# Patient Record
Sex: FEMALE | Race: WHITE | Employment: UNEMPLOYED | URBAN - METROPOLITAN AREA
[De-identification: names, ages, dates, MRNs, and addresses within clinical notes are randomized per-mention and may not be internally consistent; named-entity substitution may affect disease eponyms.]

---

## 2022-03-17 ENCOUNTER — OFFICE VISIT (OUTPATIENT)
Dept: BARIATRICS/WEIGHT MGMT | Age: 52
End: 2022-03-17
Payer: COMMERCIAL

## 2022-03-17 VITALS
HEIGHT: 66 IN | WEIGHT: 230 LBS | HEART RATE: 80 BPM | BODY MASS INDEX: 36.96 KG/M2 | SYSTOLIC BLOOD PRESSURE: 120 MMHG | DIASTOLIC BLOOD PRESSURE: 80 MMHG

## 2022-03-17 DIAGNOSIS — R06.83 SNORING: ICD-10-CM

## 2022-03-17 DIAGNOSIS — E03.9 ACQUIRED HYPOTHYROIDISM: Primary | ICD-10-CM

## 2022-03-17 DIAGNOSIS — E66.9 OBESITY (BMI 35.0-39.9 WITHOUT COMORBIDITY): ICD-10-CM

## 2022-03-17 PROCEDURE — 99203 OFFICE O/P NEW LOW 30 MIN: CPT | Performed by: SURGERY

## 2022-03-17 RX ORDER — LEVOTHYROXINE SODIUM 88 UG/1
TABLET ORAL
COMMUNITY
Start: 2022-03-03

## 2022-03-17 RX ORDER — AMOXICILLIN AND CLAVULANATE POTASSIUM 500; 125 MG/1; MG/1
TABLET, FILM COATED ORAL
Status: ON HOLD | COMMUNITY
Start: 2022-03-01 | End: 2022-08-05 | Stop reason: HOSPADM

## 2022-03-17 RX ORDER — TRAZODONE HYDROCHLORIDE 100 MG/1
TABLET ORAL
Status: ON HOLD | COMMUNITY
Start: 2022-01-11 | End: 2022-08-05 | Stop reason: HOSPADM

## 2022-03-17 RX ORDER — ETONOGESTREL AND ETHINYL ESTRADIOL 11.7; 2.7 MG/1; MG/1
INSERT, EXTENDED RELEASE VAGINAL
COMMUNITY
Start: 2022-01-05

## 2022-03-17 RX ORDER — ARIPIPRAZOLE 5 MG/1
TABLET ORAL
COMMUNITY
Start: 2022-02-04

## 2022-03-17 RX ORDER — LITHIUM CARBONATE 300 MG/1
TABLET, FILM COATED, EXTENDED RELEASE ORAL
COMMUNITY
Start: 2022-02-17

## 2022-03-17 RX ORDER — FERROUS SULFATE 325(65) MG
325 TABLET ORAL
Status: ON HOLD | COMMUNITY
End: 2022-08-05 | Stop reason: HOSPADM

## 2022-03-17 RX ORDER — NEOMYCIN SULFATE, POLYMYXIN B SULFATE, AND DEXAMETHASONE 3.5; 10000; 1 MG/G; [USP'U]/G; MG/G
OINTMENT OPHTHALMIC
Status: ON HOLD | COMMUNITY
Start: 2022-03-01 | End: 2022-08-05 | Stop reason: HOSPADM

## 2022-03-17 RX ORDER — BUPROPION HYDROCHLORIDE 100 MG/1
100 TABLET, EXTENDED RELEASE ORAL 2 TIMES DAILY
COMMUNITY
Start: 2022-02-17 | End: 2023-02-17

## 2022-03-18 ENCOUNTER — TELEPHONE (OUTPATIENT)
Dept: BARIATRICS/WEIGHT MGMT | Age: 52
End: 2022-03-18

## 2022-03-18 NOTE — TELEPHONE ENCOUNTER
Patient would like sleep study faxed to [facility], they need the order and office note faxed to [phone]

## 2022-03-23 ENCOUNTER — TELEPHONE (OUTPATIENT)
Dept: BARIATRICS/WEIGHT MGMT | Age: 52
End: 2022-03-23

## 2022-03-23 NOTE — TELEPHONE ENCOUNTER
Pt calls with same request:  \"Patient would like sleep study faxed to [facility], they need the order and office note faxed to [phone]\"    Advised pt it will be sent when note and order is complete.

## 2022-03-24 NOTE — PROGRESS NOTES
[address] [facility] INVASIVE BARIATRIC SURG  [address] [place] [address] [place]  [address] [facility] [address]  Dept: [phone]    SURGICAL WEIGHT MANAGEMENT PROGRAM  PROGRESS NOTE INITIAL EVALUATION     Patient: Lalita Aj        Service Date: 3/17/2022     HPI:     Chief Complaint   Patient presents with    Bariatric, Initial Visit    Weight Loss       The patient is a pleasant 49y.o. year old female  with morbid obesity, who stands Height: 5' 5.5\" (166.4 cm) tall with a weight of Weight: 230 lb (104.3 kg) , resulting in a BMI of Body mass index is 37.69 kg/m². . The patient suffers from multiple co-morbidities as a result of morbid obesity, including: Hypothyroidism, Dyspnea on Exertion and Depression. She has suffered from obesity for many years. The patient denies  a history of myocardial infarction, deep vein thrombosis, pulmonary embolism, renal failure, hepatic failure and stroke. The patient has failed multiple attempts at non-surgical weight loss, and is now seeking surgical intervention to promote permanent and consistent weight loss. She  has chosen Sleeve Gastrectomy. She is well educated regarding it, as she has recently viewed our weight loss surgery informational seminar . Medical History:  No past medical history on file. Surgical History:  No past surgical history on file. Family History:  No family history on file. Social History:   Social History     Tobacco Use    Smoking status: Former Smoker     Packs/day: 1.00     Years: 35.00     Pack years: 35.00     Types: Cigarettes    Smokeless tobacco: Never Used   Substance Use Topics    Alcohol use:  Yes     Alcohol/week: 1.0 standard drink     Types: 1 Cans of beer per week     Comment: weekly    Drug use: Yes     Frequency: 7.0 times per week     Comment: cbd gummies       Current Med List:  Current Outpatient Medications   Medication Sig Dispense Refill    amoxicillin-clavulanate (AUGMENTIN) 500-125 MG per tablet TAKE 1 TABLET BY MOUTH TWICE A DAY      ARIPiprazole (ABILIFY) 5 MG tablet TAKE 1 TABLET BY MOUTH EVERY DAY      buPROPion (WELLBUTRIN SR) 100 MG extended release tablet Take 100 mg by mouth 2 times daily      etonogestrel-ethinyl estradiol (ELURYNG) 0.12-0.015 MG/24HR vaginal ring INSERT 1 RING VAGINALLY EVERY 4 WEEKS, LEAVE IN PLACE 3 WEEKS, THEN REMOVE      ferrous sulfate (IRON 325) 325 (65 Fe) MG tablet Take 325 mg by mouth daily (with breakfast)      levothyroxine (SYNTHROID) 88 MCG tablet TAKE 1 TABLET BY MOUTH EVERY DAY      lithium (LITHOBID) 300 MG extended release tablet 300 MG IN THE AM  MG AT NIGHT      neomycin-polymyxin-dexameth 3.5-32191-6.1 OINT APPLY 1 A THIN LAYER TWICE A DAY AFTER SUTURES ARE REMOVED IN 2 WEEKS      traZODone (DESYREL) 100 MG tablet TAKE 2 TABLETS (200 MG) BY MOUTH NIGHTLY AS NEEDED FOR SLEEP. No current facility-administered medications for this visit. SOCIAL:      This patient is alone for the evaluation today.       [] HIV Risk Factors (i.e.) intravenous drug abuser; at risk sexual behavior; received blood products    [] TB Risk Factors (i.e.) Medically underserved, institutional care, foreign born, endemic area; exposure to active case    [] Hepatitis B&C Risk Factors (i.e.) Received blood transfusion prior to 1992; recreational drug use; high risk sexual behaviors; tattoos or body piercings; contact with blood or needle sticks in the workplace    Comprehension    Ability to grasp concepts and respond to questions:   [x] High   [] Medium   [] Low    Motivation    [x] Asks Questions; eager to learn   [] Needs education   [] Extreme anxiety    [] uncooperative   [] Denies need for education    English Speaking Ability    [x] Speaks English well   [x] Reads English well   [x] Understands spoken english    [x] Understands written English   [] No need for interpretive support      [] Might benefit from interpretive support   []  required for all services     REVIEW OF SYSTEMS: (Negative unless marked otherwise)     See review of Systems scanned into media    PRESENT ILLNESS:     Weight Parameters  Weight 230 lb (104.3 kg)   Height 5' 5.5\" (1.664 m)   BMI Body mass index is 37.69 kg/m². IBW     EBW               IMMUNIZATION STATUS    There is no immunization history on file for this patient. FALLS ASSESSMENT    [] LOW RISK FOR FALLS    [] MODERATE RISK FOR FALLS    [] Difficulty walking/selfcare    [] Falls in the past 2 months    [] Suspicion of Clinician    [] Other:      SMOKING CESSATION     [] Not needed     [] Instructed to stop smoking    [] Pamphlet community resources given     VTE SCREEN    [] Family hx DVT/PE  /   [] Personal hx of DVT/PE    [x] Denies any family or personal hx of DVT/PE    Physician Review    [x] Past medical, family, & social history reviewed and discussed with patient. Review of surgery and post-surgical changes (by surgeon for surgical patients only)    [x] Lifelong diet expectations reviewed with patient    [x] Need for lifelong vitamin supplementation reviewed with patient    PHYSICAL EXAMINATION:      /80 (Site: Right Upper Arm, Position: Sitting, Cuff Size: Large Adult)   Pulse 80   Ht 5' 5.5\" (1.664 m)   Wt 230 lb (104.3 kg)   BMI 37.69 kg/m²     Constitutional:  Vital signs are normal. The patient appears well-developed   HEENT:      Head: Normocephalic. Atraumatic     Eyes: pupils are equal and reactive. No scleral icterus is present. Neck: No mass and no thyromegaly present. Cardiovascular: Normal rate, regular rhythm, S1 normal and S2 normal.  Bilateral pulses present. Pulmonary/Chest: Effort normal and breath sounds normal. No retractions. Abdominal: Soft. Normal appearance. There is no organomegaly. No tenderness. There is no rigidity, no rebound, no guarding and no Ford's sign. Musculoskeletal:      Right lower leg: Normal. No tenderness and no edema. Left lower leg: Normal. No tenderness and no edema. Lymphadenopathy:     No cervical adenopathy, No Exrtemity Adenopathy. Neurological: The patient is alert and oriented. Moving all four extremities equally, sensation grossly intact bilateral.  Skin: Skin is warm, dry and intact. Psychiatric: The patient has a normal mood and affect. Speech is normal and behavior is normal. Judgment and thought content normal. Cognition and memory are normal.     RECOMMENDATIONS:     We spent a great deal of time discussing the risks and benefits of Sleeve Gastrectomy, including but not limited to injury to intra-abdominal organs, breakdown of the gastric staple line, the need for re-operative therapy,  prolonged hospitalization,  mechanical ventilation,  and death. We discussed the possibility of bleeding, the need for blood transfusions, blood clots, hospital-acquired and intra-abdominal infection, anastomotic stricture, and worsening GERD. And we discussed the need for post-operative visit compliance, behavior modifications and diet changes, protein and vitamin supplementation, as well as routine scheduled and dedicated exercise. I instructed the patient to utilize the exercise log that will be given to them at their fist dietician appointment. We discussed the potential weight loss benefit of approximately 60-70% of her excess body weight at 12-18 months post-op, as well as the possibility of insufficient weight loss or weight gain after 2 years post-operative time. Discussed the risk of substance abuse and or nicotine abuse today with patient. They expressed understanding of the risks of abuse of such drugs. PLAN:       Diagnosis Orders   1. Acquired hypothyroidism     2. Snoring  Baseline Diagnostic Sleep Study   3.  Obesity (BMI 35.0-39.9 without comorbidity)            Initial Testing     Primary Procedure: Sleeve Gastrectomy     Other Procedures:None    Labwork: Initial Pre-surgical Lab Tests (CMP, TSH, Fasting Lipid Profile, Mg, Zinc, Vit B1 (whole blood), Vit B12, 25-OH Vit D, Fe,  Ferritin,  Folate) and Negative serum nicotine prior to submission for pre-auth    Imaging: None    Endoscopic Studies: None    Psychological Assessment: Psychological Evaluation and Clearance    Nutrition Assessment: Bariatric Nutrition Assessment and Clearance    Pulmonary Evaluation: Obstructive Sleep Apnea Evaluation for snoring    Other  Consultations: Medical clearance with hypothyroidism and BMI 37    If sleep study is positive she can move forward with psychology evaluation. I cleared by psych she is then a candidate for surgery    Physician Supervised Diet and Exercise required by the patients insurance company: 3 months.       Surgical Diet requirement:  2 weeks      Final Testing  Screening Chest Xray  and EKG within 6 months of date of surgery    Labwork:  Final Lab Tests  within 3 months of date of surgery (CBC, PT/PTT, BMP)     Electronically signed by Marry Lundborg, DO on 3/23/2022 at 9:23 PM

## 2022-05-23 NOTE — TELEPHONE ENCOUNTER
Patient states she had sleep study done at [facility] [place] in [facility] ( [phone]), have attempt to call facility 3x and no answer.  Will try again

## 2022-05-23 NOTE — TELEPHONE ENCOUNTER
See 3/23/22 encounter re same subject; pt calls today requesting sleep study results to see if she needs to come in for 5/27/22 appt with Dr. Maricel Edouard; she drives a ways to get here.

## 2022-05-23 NOTE — TELEPHONE ENCOUNTER
We do not have patient's sleep study results, left pt vm to inform us where she had it done to obtain prior to appt

## 2022-05-24 DIAGNOSIS — R06.83 SNORING: ICD-10-CM

## 2022-05-27 ENCOUNTER — OFFICE VISIT (OUTPATIENT)
Dept: BARIATRICS/WEIGHT MGMT | Age: 52
End: 2022-05-27
Payer: COMMERCIAL

## 2022-05-27 VITALS
HEART RATE: 78 BPM | BODY MASS INDEX: 38.09 KG/M2 | WEIGHT: 237 LBS | SYSTOLIC BLOOD PRESSURE: 126 MMHG | DIASTOLIC BLOOD PRESSURE: 74 MMHG | RESPIRATION RATE: 20 BRPM | HEIGHT: 66 IN

## 2022-05-27 DIAGNOSIS — G47.33 OSA (OBSTRUCTIVE SLEEP APNEA): Primary | ICD-10-CM

## 2022-05-27 DIAGNOSIS — E66.01 MORBID OBESITY DUE TO EXCESS CALORIES (HCC): ICD-10-CM

## 2022-05-27 PROCEDURE — 99213 OFFICE O/P EST LOW 20 MIN: CPT | Performed by: SURGERY

## 2022-06-01 ENCOUNTER — NURSE ONLY (OUTPATIENT)
Dept: BARIATRICS/WEIGHT MGMT | Age: 52
End: 2022-06-01

## 2022-06-01 VITALS — WEIGHT: 238.6 LBS | BODY MASS INDEX: 39.1 KG/M2

## 2022-06-01 NOTE — PROGRESS NOTES
Medical Nutrition Therapy  Initial Nutrition Assessment for Metabolic/ Bariatric Surgery  Required insurance visit prior to surgery:  3  Shared with patient the importance of documenting exercise and staying at or below start weight during visits. Carroll Alejo is a 47 y.o. female with a date of birth of 1970. Weight History: Wt Readings from Last 3 Encounters:   06/01/22 238 lb 9.6 oz (108.2 kg)   05/27/22 237 lb (107.5 kg)   03/17/22 230 lb (104.3 kg)        How does your weight affect your daily activities?joint pain, back pain, fatigue and short of breath with activity      What would be different in your life if you felt healthier and fit? Less pain      Why is that important to you now? I have to use marijuana for pain relief and don't want to use narcotics    Do you drink alcohol? YES, weekly    Do you use tobacco in the form of cigarettes, cigars, chew or any vapor appliance? No and I do use marijuana for pain    All female patients have been educated on the importance of using reliable birth control and avoiding pregnancy the first 2 years following bariatric surgery. All female patients will have a pregnancy test the morning of surgery to confirm. All patients are nicotine tested and require a negative nicotine level prior to surgery. Patient has been educated about the risks associated with substance use, as well as the risks of using nicotine and alcohol after surgery. Patient has been educated that these substances need to be avoided lifelong after surgery to reduce the risk of complications and sub-optimal weight loss. Weight History      Kristen Zavala's highest adult weight was 238 lbs at age 47. Patient was at her highest weight for some time. Patient's triggers/known causes to her highest weight are unstated. Kristen Zavala's lowest adult weight was 147 lbs at age 49. Patient was at her lowest weight for some time.    The lowest weight was achieved through just what Kristen weighed . Physical Activity  Do you participate in a structured exercise program, step counting or regular physical activity? Yes, biking 5x/week biking      Instructions and exercise logs were provided to patient today see goal sheet and plan. Previous weight loss attempts  Patient has participated in the following weight loss programs:   the [facility], self directed calorie restriction, lowcarb and physician supervised      Nutrition History  Have you ever been diagnosed with an eating disorder? No  Have you ever had problems tolerating a multivitamin or mineral supplement?no  Have you ever been diagnosed with a vitamin or mineral deficiency? no     Patient dines out to a sit down restaurant 3 times per week. Patient dines out to a fast food restaurant 2 times per week. Patient does have grazing. Patient does have night eating. Patient does have a history of emotional eating. Patient does have a history of  eating out of boredom. Drinks throughout the day: water and Diet pop    24 hour recall/food frequency: has been scanned into chart unless completed below. Assessment:  Nutritional Needs:  Men: 1500kcal daily minimum     Women 1200kcal daily minimum. 60-80gm of protein daily  PES Statement:  Obesity related to a complex combination of decreased energy needs, disordered eating patterns, physical inactivity, and increased psychological/life stress as evidenced by BMI greater than 30 and inability to maintain a significant amount of weight loss through conventional weight loss interventions. Goals    All goals were planned with and agreed on by the patient. I want to improve my health because I want to be there for my kids. appt # NA G What is your next step? C 1 2 3 4 5 6 7 8 9     0  1 I will read the education binder provided to me and the                 0  2 I will make my pschological evaluation appoinment.                0  3 I will bring this goal card to every appointment. x 4 I will eliminate all tobacco/nicotine. x 5 I will limit alcoholic beverages to 7-3SZ per week. 6 I will limit dining out to 3 times per week or less. 7 I will eliminate sugary beverages. 8 I will eliminate carbonated beverages. 9 I will eliminate drinking with a straw. 10 I will limit caffeinated beverages to 16oz daily. 0  11 I will limit log my exercise daily. 12 I will determine my calcium and mvi plan. 13 I will have 1-2 servings of lean protein present at each meal and minimeal.                 14 I will eat every 3-5 hours. 15 I will drink 64oz of fluid daily. 16 I will eat slowly during meals and snacks. 17 I will limit fluids 4oz before after and during meals. 18 I will eat protein first at all meals followed by vegetables,  Fruit and lastly whole grains. 23 My first weight neutral approach is:                 20 My second weight neutral approach is:                 21  My Thirds weight neutral approach is:                 22                  23                  24                  25                    Goals reviewed with patient as below  Do you understand your goals? Do you have the information you need to achieve your goals? Do you have any questions  right now? Plan    Exercise for Health 15 easy exercises to do at home with 6 activity logs were provided to the patient with verbal and written instructions on how to carry this out. Goal number 14 was provided to the patient on this visit please see above. Will follow up each month and provide support as patient begins to add physical activity to life style. Monitor and review goals adjust as needed. Follow up monthly supervised diet and exercise.        Rachel Alonzo MS, CASSANDRA, DIEGO

## 2022-06-03 NOTE — PROGRESS NOTES
[address] [facility] INVASIVE BARIATRIC SURG  [address]  [address]  [facility] [address]  Dept: [phone]    5/27/2022    CC: Weight Loss    History:  47year old female with morbid obesity and BMI 39 with associated co-morbidities of sleep apnea. Recent sleep test was positive. She would like to pursue surgical weight loss. She has been working on cutting carbohydrates and increasing more frequent meals. She denies any new complaints.       Review of Systems:  General  Negative for: [] Weight Change   [x] Fatigue   [x] Fevers & Chills    [] Appetite change [] Other:    Positive for: [x] Weight Change   [] Fatigue   [] Fevers & Chills    [] Appetite change [] Other:   Cardiac  Negative for: [x] Chest Pain   [x] Difficulty Breathing   [] Leg Cramps [x] Edema of Lower Extremeties    [] Left   [] Right      Positive for: [] Chest Pain   [] Difficulty Breathing   [] Leg Cramps [] Edema of Lower Extremeties    [] Left   [] Right   Pulmonary  Negative for: [x] Shortness of Breath [] Wheeze [x] Cough  [] Calf Pain     Positive for: [] Shortness of Breath [] Wheeze [] Cough  [] Calf Pain   Gastro-Intestinal Negative for: [] Heartburn   [] Reflux   [] Dysphagia   [] Melena   [] BRBPR  [x] Vomiting   [x] Abdominal Pain   [] Diarrhea     [] Constipation  [] Other:     Positive for: [] Heartburn   [] Reflux   [] Dysphagia   [] Melena   [] BRBPR  [] Vomiting   [] Abdominal Pain   [] Diarrhea     [] Constipation  [] Other:    Muskuloskeletal Negative for: [] Joint pain   [] Back pain   [] Knee Pain   [x] Muscle weakness [x] Hernia   [] Edema [] Other:     Positive for: [] Joint pain   [] Back pain   [] Knee Pain   [] Muscle weakness [] Hernia   [] Edema [] Other:    Neurologic Negative for: [x] Syncope   [x] Insomnia   [] Being treated for depression  [] Other:     Positive for: [] Syncope   [] Insomnia   [] Being treated for depression  [] Other:    Skin Negative for: [] Wound:   [] Open   [] Draining   [] Incisional     [x] Rash   [x] Hair Loss  [] Other:     Positive for: [] Wound:   [] Open   [] Draining    [] Incisional  [] Rash   [] Hair Loss  [] Other:        Physical Exam:  /74 (Site: Right Upper Arm, Position: Sitting, Cuff Size: Large Adult)   Pulse 78   Resp 20   Ht 5' 5.5\" (1.664 m)   Wt 237 lb (107.5 kg)   BMI 38.84 kg/m²   Constitutional:  Vital signs are normal. The patient appears well-developed and well-nourished. HEENT:   Head: Normocephalic. Atraumatic  Eyes: pupils are equal and reactive. No scleral icterus is present. Neck: No mass and no thyromegaly present. Cardiovascular: Normal rate, regular rhythm, S1 normal and S2 normal.  Radial pulses present   Pulmonary/Chest: Effort normal and breath sounds normal. No retractions  Abdominal: Soft. Normal appearance. There is no organomegaly. No tenderness. There is no rigidity, no rebound, no guarding and no Ford's sign. Musculoskeletal:        Right lower leg: Normal. No tenderness and no edema. Left lower leg: Normal. No tenderness and no edema. Neurological: The patient is alert and oriented. Moving all 4 extremities, sensation grossly intact bilateral  Skin: Skin is warm, dry and intact. Psychiatric: The patient has a normal mood and affect.  Speech is normal and behavior is normal. Judgment and thought content normal. Cognition and memory are normal.     Assessment:  Obstructive sleep apnea  Morbid obesity    Plan:  Sleep study reviewed with patient  Dietician visit  Protein 80 gm/day  Exercise 30 minutes per day  Calories 1200 per day  Water 60 oz per day  Continue supervised diet and exercise  EGD  Psych evaluation  Bariatric labs

## 2022-07-06 ENCOUNTER — TELEPHONE (OUTPATIENT)
Dept: BARIATRICS/WEIGHT MGMT | Age: 52
End: 2022-07-06

## 2022-07-07 ENCOUNTER — NURSE ONLY (OUTPATIENT)
Dept: BARIATRICS/WEIGHT MGMT | Age: 52
End: 2022-07-07

## 2022-07-08 ENCOUNTER — OFFICE VISIT (OUTPATIENT)
Dept: BARIATRICS/WEIGHT MGMT | Age: 52
End: 2022-07-08
Payer: COMMERCIAL

## 2022-07-08 VITALS
WEIGHT: 240 LBS | HEIGHT: 66 IN | BODY MASS INDEX: 38.57 KG/M2 | HEART RATE: 98 BPM | DIASTOLIC BLOOD PRESSURE: 87 MMHG | SYSTOLIC BLOOD PRESSURE: 133 MMHG

## 2022-07-08 DIAGNOSIS — F32.A DEPRESSION, UNSPECIFIED DEPRESSION TYPE: ICD-10-CM

## 2022-07-08 DIAGNOSIS — M19.90 ARTHRITIS: ICD-10-CM

## 2022-07-08 DIAGNOSIS — G47.33 OSA (OBSTRUCTIVE SLEEP APNEA): Primary | ICD-10-CM

## 2022-07-08 DIAGNOSIS — E03.9 ACQUIRED HYPOTHYROIDISM: ICD-10-CM

## 2022-07-08 DIAGNOSIS — E66.9 OBESITY (BMI 30-39.9): ICD-10-CM

## 2022-07-08 PROCEDURE — 99213 OFFICE O/P EST LOW 20 MIN: CPT | Performed by: NURSE PRACTITIONER

## 2022-07-08 NOTE — PROGRESS NOTES
and snacks. 17 I will limit fluids 4oz before after and during meals. 18 I will eat protein first at all meals followed by vegetables,  Fruit and lastly whole grains. 1  19 My first weight neutral approach is:  I will eat breakfast.                 20 My second weight neutral approach is:                 21  My Thirds weight neutral approach is:                 22                  23                  24                  25                    Questions asked for goal understanding:                                                  Do you understand your goals? Do you have the information you need to achieve your goals? Do you have any questions  right now? [x]  Consistent goal achievement in the program thus far and further success with goals is expected. []  Unable to consistently make progress in goal achievement. At this time patient is not moving forward  in developing the skills needed for success after surgery. Plan:    Continue to follow monthly and review goals.          [x]  Nutrition visits complete    []

## 2022-07-08 NOTE — PROGRESS NOTES
Medical Weight Management Progress Note    Subjective      Patient being seen for medically supervised weight loss for the chronic conditions of MAURICE, Depression ([facility] in [place], [place]), Arthritis, Hypothyroidism. She is working on the behavior changes discussed at the initial appointment. Patient continues on diet plan. Physical activity includes walking. Weight today is 240 lbs. Psych eval completed and clearance received. Sleep study completed and dx MAURICE, needs CPAP. Needs pulmonary clearance. EGD scheduled 8/5/22. No current issues. Working toward bariatric surgery:    [x] Sleeve Gastrectomy                                                           [] Gordy-en-Y Gastric Bypass    Allergies:  No Known Allergies    Past Medical History:   History reviewed. No pertinent past medical history. .    Past Surgical History:  History reviewed. No pertinent surgical history. Family History:  History reviewed. No pertinent family history. Social History:  Social History     Socioeconomic History    Marital status:      Spouse name: Not on file    Number of children: Not on file    Years of education: Not on file    Highest education level: Not on file   Occupational History    Not on file   Tobacco Use    Smoking status: Former Smoker     Packs/day: 1.00     Years: 35.00     Pack years: 35.00     Types: Cigarettes    Smokeless tobacco: Never Used   Substance and Sexual Activity    Alcohol use:  Yes     Alcohol/week: 1.0 standard drink     Types: 1 Cans of beer per week     Comment: weekly    Drug use: Yes     Frequency: 7.0 times per week     Comment: cbd gummies    Sexual activity: Not on file   Other Topics Concern    Not on file   Social History Narrative    Not on file     Social Determinants of Health     Financial Resource Strain:     Difficulty of Paying Living Expenses: Not on file   Food Insecurity:     Worried About Running Out of Food in the Last Year: Not on file    Ran Out of Food in the Last Year: Not on file   Transportation Needs:     Lack of Transportation (Medical): Not on file    Lack of Transportation (Non-Medical): Not on file   Physical Activity:     Days of Exercise per Week: Not on file    Minutes of Exercise per Session: Not on file   Stress:     Feeling of Stress : Not on file   Social Connections:     Frequency of Communication with Friends and Family: Not on file    Frequency of Social Gatherings with Friends and Family: Not on file    Attends Caoda[employer] Services: Not on file    Active Member of [address] [place] [address] [employer] or Organizations: Not on file    Attends Club or Organization Meetings: Not on file    Marital Status: Not on file   Intimate Partner Violence:     Fear of Current or Ex-Partner: Not on file    Emotionally Abused: Not on file    Physically Abused: Not on file    Sexually Abused: Not on file   Housing Stability:     Unable to Pay for Housing in the Last Year: Not on file    Number of [place] in the Last Year: Not on file    Unstable Housing in the Last Year: Not on file       Current Medications:  Current Outpatient Medications   Medication Sig Dispense Refill    ARIPiprazole (ABILIFY) 5 MG tablet TAKE 1 TABLET BY MOUTH EVERY DAY      buPROPion (WELLBUTRIN SR) 100 MG extended release tablet Take 100 mg by mouth 2 times daily      etonogestrel-ethinyl estradiol (ELURYNG) 0.12-0.015 MG/24HR vaginal ring INSERT 1 RING VAGINALLY EVERY 4 WEEKS, LEAVE IN PLACE 3 WEEKS, THEN REMOVE      ferrous sulfate (IRON 325) 325 (65 Fe) MG tablet Take 325 mg by mouth daily (with breakfast)      levothyroxine (SYNTHROID) 88 MCG tablet TAKE 1 TABLET BY MOUTH EVERY DAY      lithium (LITHOBID) 300 MG extended release tablet 300 MG IN THE AM  MG AT NIGHT      traZODone (DESYREL) 100 MG tablet TAKE 2 TABLETS (200 MG) BY MOUTH NIGHTLY AS NEEDED FOR SLEEP.       amoxicillin-clavulanate (AUGMENTIN) 500-125 MG per tablet TAKE 1 TABLET BY MOUTH TWICE A DAY (Patient not taking: Reported on 5/27/2022)      neomycin-polymyxin-dexameth 3.5-01713-6.1 OINT APPLY 1 A THIN LAYER TWICE A DAY AFTER SUTURES ARE REMOVED IN 2 WEEKS (Patient not taking: Reported on 5/27/2022)       No current facility-administered medications for this visit. Vital Signs:  /87 (Site: Right Upper Arm, Position: Sitting, Cuff Size: Large Adult)   Pulse 98   Ht 5' 5.5\" (1.664 m)   Wt 240 lb (108.9 kg)   BMI 39.33 kg/m²     BMI/Height/Weight:  Body mass index is 39.33 kg/m². Review of Systems - A review of systems was performed. All was negative unless otherwise documented in HPI. Constitutional: Negative for fever, chills. HENT: Negative for trouble swallowing. Eyes: Negative for visual disturbance. Respiratory: Negative for cough, shortness of breath. Cardiovascular: Negative for chest pain and palpitations. Gastrointestinal: Negative for nausea, vomiting, abdominal pain, diarrhea, constipation, blood in stool and abdominal distention. Endocrine: Negative for polydipsia, polyphagia and polyuria. Genitourinary: Negative for dysuria, frequency, hematuria and difficulty urinating. Musculoskeletal: Negative for myalgias, joint swelling. Skin: Negative for pallor and rash. Neurological: Negative for dizziness, tremors, light-headedness and headaches. Psychiatric/Behavioral: Negative for sleep disturbance and dysphoric mood. Objective:      Physical Exam   Vital signs reviewed. General: Well-developed and well-nourished. No acute distress. Skin: Warm, dry and intact. HEENT: Normocephalic. EOMs intact. Conjunctivae normal. Neck supple. Cardiovascular: Normal rate, regular rhythm. Pulmonary/Chest: Normal effort. Lungs clear to auscultation. No rales, rhonchi or wheezing. Abdominal: Positive bowel sounds. Soft, nontender. Nondistended. Musculoskeletal: Movement x4. No edema.   Neurological: Gait normal. Alert and oriented to person, place, and time. Psychiatric: Normal mood and affect. Speech and behavior normal. Judgment and thought content normal. Cognition and memory intact. Assessment:       Diagnosis Orders   1. MAURICE (obstructive sleep apnea)  CBC with Auto Differential    Comprehensive Metabolic Panel    Ferritin    Hemoglobin A1C    Iron and TIBC    Lipid Panel    Magnesium    PTH, Intact    TSH    Vitamin A    Vitamin B1    Vitamin B12 & Folate    Vitamin D 25 Hydroxy    Zinc   2. Acquired hypothyroidism  CBC with Auto Differential    Comprehensive Metabolic Panel    Ferritin    Hemoglobin A1C    Iron and TIBC    Lipid Panel    Magnesium    PTH, Intact    TSH    Vitamin A    Vitamin B1    Vitamin B12 & Folate    Vitamin D 25 Hydroxy    Zinc   3. Arthritis  CBC with Auto Differential    Comprehensive Metabolic Panel    Ferritin    Hemoglobin A1C    Iron and TIBC    Lipid Panel    Magnesium    PTH, Intact    TSH    Vitamin A    Vitamin B1    Vitamin B12 & Folate    Vitamin D 25 Hydroxy    Zinc   4. Depression, unspecified depression type  CBC with Auto Differential    Comprehensive Metabolic Panel    Ferritin    Hemoglobin A1C    Iron and TIBC    Lipid Panel    Magnesium    PTH, Intact    TSH    Vitamin A    Vitamin B1    Vitamin B12 & Folate    Vitamin D 25 Hydroxy    Zinc   5. Obesity (BMI 30-39. 9)  CBC with Auto Differential    Comprehensive Metabolic Panel    Ferritin    Hemoglobin A1C    Iron and TIBC    Lipid Panel    Magnesium    PTH, Intact    TSH    Vitamin A    Vitamin B1    Vitamin B12 & Folate    Vitamin D 25 Hydroxy    Zinc       Plan:    Dietitian visit today. Patient was encouraged to journal all food intake. Keep calorie level at approximately 0918-2113. Protein intake is to be a minimum of 60-80 grams per day. Water drinking was encouraged with a goal of 64oz-128oz daily. Beverages to be calorie free except for milk. Every other beverage should be water. Avoid soda. Continue to increase level of physical activity. Date:   7/8/2023    TSH     Standing Status:   Future     Standing Expiration Date:   7/8/2023    Vitamin A     Standing Status:   Future     Standing Expiration Date:   7/8/2023    Vitamin B1     Standing Status:   Future     Standing Expiration Date:   7/8/2023    Vitamin B12 & Folate     Standing Status:   Future     Standing Expiration Date:   7/8/2023    Vitamin D 25 Hydroxy     Standing Status:   Future     Standing Expiration Date:   7/8/2023    Zinc     Standing Status:   Future     Standing Expiration Date:   7/8/2023       Prescriptions this encounter:  No orders of the defined types were placed in this encounter.       Electronically signed by:  Adolfo Cueto CNP

## 2022-07-12 ENCOUNTER — TELEPHONE (OUTPATIENT)
Dept: BARIATRICS/WEIGHT MGMT | Age: 52
End: 2022-07-12

## 2022-07-12 NOTE — TELEPHONE ENCOUNTER
Lisbeth Osborne from [address] called regarding note prior to 5/3/22 sleep study, consultation faxed to ph:[phone]

## 2022-07-13 NOTE — TELEPHONE ENCOUNTER
Adalgisa Benz called from [address] [place] [address] equipment, needed diagnosis for cpap approval, sent office visit from 3-17-22 to [phone]

## 2022-07-14 ENCOUNTER — TELEPHONE (OUTPATIENT)
Dept: BARIATRICS/WEIGHT MGMT | Age: 52
End: 2022-07-14

## 2022-07-27 LAB
ALBUMIN SERPL-MCNC: 4.1 G/DL
ALP BLD-CCNC: 47 U/L
ALT SERPL-CCNC: 22 U/L
ANION GAP SERPL CALCULATED.3IONS-SCNC: 13 MMOL/L
AST SERPL-CCNC: 17 U/L
AVERAGE GLUCOSE: 105
BASOPHILS ABSOLUTE: 0 /ΜL
BASOPHILS RELATIVE PERCENT: 0.5 %
BILIRUB SERPL-MCNC: 0.4 MG/DL (ref 0.1–1.4)
BUN BLDV-MCNC: 14 MG/DL
CALCIUM SERPL-MCNC: 9 MG/DL
CHLORIDE BLD-SCNC: 108 MMOL/L
CHOLESTEROL, TOTAL: 219 MG/DL
CHOLESTEROL/HDL RATIO: 3.6
CO2: 17 MMOL/L
CREAT SERPL-MCNC: 0.85 MG/DL
EOSINOPHILS ABSOLUTE: 0.1 /ΜL
EOSINOPHILS RELATIVE PERCENT: 1.3 %
FERRITIN: 221 NG/ML (ref 9–150)
FOLATE: NORMAL
GFR CALCULATED: NORMAL
GLUCOSE BLD-MCNC: 101 MG/DL
HBA1C MFR BLD: 5.3 %
HCT VFR BLD CALC: 39.5 % (ref 36–46)
HDLC SERPL-MCNC: 61 MG/DL (ref 35–70)
HEMOGLOBIN: 13.7 G/DL (ref 12–16)
IRON: 113
LDL CHOLESTEROL CALCULATED: 118 MG/DL (ref 0–160)
LYMPHOCYTES ABSOLUTE: 2.1 /ΜL
LYMPHOCYTES RELATIVE PERCENT: 28.3 %
MAGNESIUM: 1.8 MG/DL
MCH RBC QN AUTO: 31.7 PG
MCHC RBC AUTO-ENTMCNC: 34.7 G/DL
MCV RBC AUTO: 91 FL
MONOCYTES ABSOLUTE: 0.5 /ΜL
MONOCYTES RELATIVE PERCENT: 6.2 %
NEUTROPHILS ABSOLUTE: 4.7 /ΜL
NEUTROPHILS RELATIVE PERCENT: 63.7 %
NONHDLC SERPL-MCNC: NORMAL MG/DL
PDW BLD-RTO: 13.2 %
PLATELET # BLD: 311 K/ΜL
PMV BLD AUTO: 7.3 FL
POTASSIUM SERPL-SCNC: 4.2 MMOL/L
PTH INTACT: 48
RBC # BLD: 4.33 10^6/ΜL
SODIUM BLD-SCNC: 138 MMOL/L
TOTAL IRON BINDING CAPACITY: 574
TOTAL PROTEIN: 7
TRIGL SERPL-MCNC: 201 MG/DL
TSH SERPL DL<=0.05 MIU/L-ACNC: 4.77 UIU/ML
VITAMIN B-12: 589
VITAMIN D 25-HYDROXY: 34.9
VITAMIN D2, 25 HYDROXY: NORMAL
VITAMIN D3,25 HYDROXY: NORMAL
VLDLC SERPL CALC-MCNC: NORMAL MG/DL
WBC # BLD: 7.4 10^3/ML

## 2022-07-29 ENCOUNTER — TELEPHONE (OUTPATIENT)
Dept: BARIATRICS/WEIGHT MGMT | Age: 52
End: 2022-07-29

## 2022-07-29 DIAGNOSIS — M19.90 ARTHRITIS: ICD-10-CM

## 2022-07-29 DIAGNOSIS — G47.33 OSA (OBSTRUCTIVE SLEEP APNEA): ICD-10-CM

## 2022-07-29 DIAGNOSIS — F32.A DEPRESSION, UNSPECIFIED DEPRESSION TYPE: ICD-10-CM

## 2022-07-29 DIAGNOSIS — E03.9 ACQUIRED HYPOTHYROIDISM: ICD-10-CM

## 2022-07-29 DIAGNOSIS — E66.9 OBESITY (BMI 30-39.9): ICD-10-CM

## 2022-08-04 ENCOUNTER — ANESTHESIA EVENT (OUTPATIENT)
Dept: OPERATING ROOM | Age: 52
End: 2022-08-04
Payer: COMMERCIAL

## 2022-08-04 DIAGNOSIS — M19.90 ARTHRITIS: ICD-10-CM

## 2022-08-04 DIAGNOSIS — E66.9 OBESITY (BMI 30-39.9): ICD-10-CM

## 2022-08-04 DIAGNOSIS — G47.33 OSA (OBSTRUCTIVE SLEEP APNEA): ICD-10-CM

## 2022-08-04 DIAGNOSIS — F32.A DEPRESSION, UNSPECIFIED DEPRESSION TYPE: ICD-10-CM

## 2022-08-04 DIAGNOSIS — E03.9 ACQUIRED HYPOTHYROIDISM: ICD-10-CM

## 2022-08-05 ENCOUNTER — HOSPITAL ENCOUNTER (OUTPATIENT)
Age: 52
Setting detail: OUTPATIENT SURGERY
Discharge: HOME | End: 2022-08-05
Attending: SURGERY | Admitting: SURGERY
Payer: COMMERCIAL

## 2022-08-05 ENCOUNTER — ANESTHESIA (OUTPATIENT)
Dept: OPERATING ROOM | Age: 52
End: 2022-08-05
Payer: COMMERCIAL

## 2022-08-05 VITALS
TEMPERATURE: 97.7 F | HEIGHT: 67 IN | WEIGHT: 245.5 LBS | HEART RATE: 87 BPM | DIASTOLIC BLOOD PRESSURE: 81 MMHG | SYSTOLIC BLOOD PRESSURE: 114 MMHG | OXYGEN SATURATION: 97 % | RESPIRATION RATE: 23 BRPM | BODY MASS INDEX: 38.53 KG/M2

## 2022-08-05 DIAGNOSIS — K21.9 GASTROESOPHAGEAL REFLUX DISEASE WITHOUT ESOPHAGITIS: ICD-10-CM

## 2022-08-05 DIAGNOSIS — E66.9 OBESITY, UNSPECIFIED CLASSIFICATION, UNSPECIFIED OBESITY TYPE, UNSPECIFIED WHETHER SERIOUS COMORBIDITY PRESENT: ICD-10-CM

## 2022-08-05 LAB — HCG, PREGNANCY URINE (POC): NEGATIVE

## 2022-08-05 PROCEDURE — 3700000001 HC ADD 15 MINUTES (ANESTHESIA): Performed by: SURGERY

## 2022-08-05 PROCEDURE — 2500000003 HC RX 250 WO HCPCS: Performed by: ANESTHESIOLOGY

## 2022-08-05 PROCEDURE — 2709999900 HC NON-CHARGEABLE SUPPLY: Performed by: SURGERY

## 2022-08-05 PROCEDURE — 3609012400 HC EGD TRANSORAL BIOPSY SINGLE/MULTIPLE: Performed by: SURGERY

## 2022-08-05 PROCEDURE — 43239 EGD BIOPSY SINGLE/MULTIPLE: CPT | Performed by: SURGERY

## 2022-08-05 PROCEDURE — 6360000002 HC RX W HCPCS: Performed by: ANESTHESIOLOGY

## 2022-08-05 PROCEDURE — 7100000010 HC PHASE II RECOVERY - FIRST 15 MIN: Performed by: SURGERY

## 2022-08-05 PROCEDURE — 7100000011 HC PHASE II RECOVERY - ADDTL 15 MIN: Performed by: SURGERY

## 2022-08-05 PROCEDURE — 2580000003 HC RX 258: Performed by: ANESTHESIOLOGY

## 2022-08-05 PROCEDURE — 3700000000 HC ANESTHESIA ATTENDED CARE: Performed by: SURGERY

## 2022-08-05 PROCEDURE — 88305 TISSUE EXAM BY PATHOLOGIST: CPT

## 2022-08-05 PROCEDURE — 81025 URINE PREGNANCY TEST: CPT

## 2022-08-05 RX ORDER — SODIUM CHLORIDE 0.9 % (FLUSH) 0.9 %
5-40 SYRINGE (ML) INJECTION PRN
Status: DISCONTINUED | OUTPATIENT
Start: 2022-08-05 | End: 2022-08-05 | Stop reason: HOSPADM

## 2022-08-05 RX ORDER — LIDOCAINE HYDROCHLORIDE 10 MG/ML
1 INJECTION, SOLUTION EPIDURAL; INFILTRATION; INTRACAUDAL; PERINEURAL
Status: DISCONTINUED | OUTPATIENT
Start: 2022-08-05 | End: 2022-08-05 | Stop reason: HOSPADM

## 2022-08-05 RX ORDER — SODIUM CHLORIDE 9 MG/ML
25 INJECTION, SOLUTION INTRAVENOUS PRN
Status: DISCONTINUED | OUTPATIENT
Start: 2022-08-05 | End: 2022-08-05 | Stop reason: HOSPADM

## 2022-08-05 RX ORDER — SODIUM CHLORIDE 0.9 % (FLUSH) 0.9 %
5-40 SYRINGE (ML) INJECTION EVERY 12 HOURS SCHEDULED
Status: DISCONTINUED | OUTPATIENT
Start: 2022-08-05 | End: 2022-08-05 | Stop reason: HOSPADM

## 2022-08-05 RX ORDER — SODIUM CHLORIDE 9 MG/ML
INJECTION, SOLUTION INTRAVENOUS PRN
Status: DISCONTINUED | OUTPATIENT
Start: 2022-08-05 | End: 2022-08-05 | Stop reason: HOSPADM

## 2022-08-05 RX ORDER — SODIUM CHLORIDE, SODIUM LACTATE, POTASSIUM CHLORIDE, CALCIUM CHLORIDE 600; 310; 30; 20 MG/100ML; MG/100ML; MG/100ML; MG/100ML
INJECTION, SOLUTION INTRAVENOUS CONTINUOUS PRN
Status: DISCONTINUED | OUTPATIENT
Start: 2022-08-05 | End: 2022-08-05 | Stop reason: SDUPTHER

## 2022-08-05 RX ORDER — LIDOCAINE HYDROCHLORIDE 10 MG/ML
INJECTION, SOLUTION EPIDURAL; INFILTRATION; INTRACAUDAL; PERINEURAL PRN
Status: DISCONTINUED | OUTPATIENT
Start: 2022-08-05 | End: 2022-08-05 | Stop reason: SDUPTHER

## 2022-08-05 RX ORDER — MEPERIDINE HYDROCHLORIDE 50 MG/ML
12.5 INJECTION INTRAMUSCULAR; INTRAVENOUS; SUBCUTANEOUS ONCE
Status: DISCONTINUED | OUTPATIENT
Start: 2022-08-05 | End: 2022-08-05 | Stop reason: HOSPADM

## 2022-08-05 RX ORDER — MORPHINE SULFATE 2 MG/ML
1 INJECTION, SOLUTION INTRAMUSCULAR; INTRAVENOUS EVERY 5 MIN PRN
Status: DISCONTINUED | OUTPATIENT
Start: 2022-08-05 | End: 2022-08-05 | Stop reason: HOSPADM

## 2022-08-05 RX ORDER — PANTOPRAZOLE SODIUM 20 MG/1
20 TABLET, DELAYED RELEASE ORAL DAILY
Qty: 30 TABLET | Refills: 3 | Status: SHIPPED | OUTPATIENT
Start: 2022-08-05

## 2022-08-05 RX ORDER — ONDANSETRON 2 MG/ML
4 INJECTION INTRAMUSCULAR; INTRAVENOUS
Status: DISCONTINUED | OUTPATIENT
Start: 2022-08-05 | End: 2022-08-05 | Stop reason: HOSPADM

## 2022-08-05 RX ORDER — PROPOFOL 10 MG/ML
INJECTION, EMULSION INTRAVENOUS PRN
Status: DISCONTINUED | OUTPATIENT
Start: 2022-08-05 | End: 2022-08-05 | Stop reason: SDUPTHER

## 2022-08-05 RX ORDER — SODIUM CHLORIDE, SODIUM LACTATE, POTASSIUM CHLORIDE, CALCIUM CHLORIDE 600; 310; 30; 20 MG/100ML; MG/100ML; MG/100ML; MG/100ML
INJECTION, SOLUTION INTRAVENOUS CONTINUOUS
Status: DISCONTINUED | OUTPATIENT
Start: 2022-08-05 | End: 2022-08-05 | Stop reason: HOSPADM

## 2022-08-05 RX ORDER — DIPHENHYDRAMINE HYDROCHLORIDE 50 MG/ML
12.5 INJECTION INTRAMUSCULAR; INTRAVENOUS
Status: DISCONTINUED | OUTPATIENT
Start: 2022-08-05 | End: 2022-08-05 | Stop reason: HOSPADM

## 2022-08-05 RX ADMIN — SODIUM CHLORIDE, POTASSIUM CHLORIDE, SODIUM LACTATE AND CALCIUM CHLORIDE: 600; 310; 30; 20 INJECTION, SOLUTION INTRAVENOUS at 09:50

## 2022-08-05 RX ADMIN — PROPOFOL 50 MG: 10 INJECTION, EMULSION INTRAVENOUS at 09:56

## 2022-08-05 RX ADMIN — LIDOCAINE HYDROCHLORIDE 50 MG: 10 INJECTION, SOLUTION EPIDURAL; INFILTRATION; INTRACAUDAL; PERINEURAL at 09:55

## 2022-08-05 RX ADMIN — PROPOFOL 40 MG: 10 INJECTION, EMULSION INTRAVENOUS at 10:01

## 2022-08-05 RX ADMIN — PROPOFOL 40 MG: 10 INJECTION, EMULSION INTRAVENOUS at 09:58

## 2022-08-05 RX ADMIN — SODIUM CHLORIDE, POTASSIUM CHLORIDE, SODIUM LACTATE AND CALCIUM CHLORIDE: 600; 310; 30; 20 INJECTION, SOLUTION INTRAVENOUS at 09:07

## 2022-08-05 RX ADMIN — PROPOFOL 150 MG: 10 INJECTION, EMULSION INTRAVENOUS at 09:55

## 2022-08-05 ASSESSMENT — PAIN SCALES - GENERAL: PAINLEVEL_OUTOF10: 0

## 2022-08-05 ASSESSMENT — PAIN - FUNCTIONAL ASSESSMENT: PAIN_FUNCTIONAL_ASSESSMENT: 0-10

## 2022-08-05 NOTE — OP NOTE
[facility] [place]  [address] [facility] [address]  Dept: [phone]  Loc: [phone]    Preoperative Diagnosis:  GERD, Morbid obesity, BMI of Body mass index is 38.45 kg/m². Postoperative Diagnosis: GERD with hiatal hernia, Grade C esophagitis, Morbid obesity, BMI of Body mass index is 38.45 kg/m². Procedure: Esophagogastroduodenoscopy with Biopsy    Surgeon: José Miguel Carballo DO    Assistant:    Anesthesia: MAC, see anesthesia records    Specimen:    1) Antrum for H. Pylori      Findings: Severe esophagitis, suspect Perera's    EBL: NONE    Operative Narrative: The risks and benefits were explained in detail to the patient who agreed and consented to the procedure. The patient was taken to the endoscopic suite and placed in a lateral position. Oxygen was administered via nasal cannula and a mouth guard was placed. MAC was administered via the anesthetic team.      The endoscope was then advanced into the oropharynx and down into the esophagus under direct visualization. The scope was further advanced through the esophagus, GE junction and stomach to the pylorus under visualization. The scope was passed through the pylorus and duodenal sweep performed, advancing and visualizing to the second portion of the duodenum. The scope was then withdrawn to the antrum and cold forceps were used to take biopsies of the antrum for H. Pylori. Appropriate hemostasis was noted. The scope was then retroflexed to visualize the GE junction. Evidence of a hiatal hernia was noted. The scope was then slowly withdrawn through the GE junction. The Z line was noted. The stomach was then decompressed. The scope was withdrawn from the esophagus and no further lesions noted. The scope was withdrawn. The patient tolerated the procedure well. PPI. She will follow up with the Bariatric Clinic for further assessment.

## 2022-08-05 NOTE — ANESTHESIA PRE PROCEDURE
Department of Anesthesiology  Preprocedure Note       Name:  Roberto Taveras   Age:  47 y.o.  :  1970                                          MRN:  [id number]         Date:  2022      Surgeon: Issa Vivar):  Eladia Santana DO    Procedure: Procedure(s):  EGD BIOPSY    Medications prior to admission:   Prior to Admission medications    Medication Sig Start Date End Date Taking?  Authorizing Provider   ARIPiprazole (ABILIFY) 5 MG tablet TAKE 1 TABLET BY MOUTH EVERY DAY 22   Historical Provider, MD   buPROPion [facility]) 100 MG extended release tablet Take 100 mg by mouth 2 times daily 22  Historical Provider, MD   etonogestrel-ethinyl estradiol ([facility]) 0.12-0.015 MG/24HR vaginal ring INSERT 1 RING VAGINALLY EVERY 4 WEEKS, LEAVE IN PLACE 3 WEEKS, THEN REMOVE 22   Historical Provider, MD   ferrous sulfate (IRON 325) 325 (65 Fe) MG tablet Take 325 mg by mouth daily (with breakfast)    Historical Provider, MD   levothyroxine (SYNTHROID) 88 MCG tablet TAKE 1 TABLET BY MOUTH EVERY DAY 3/3/22   Historical Provider, MD   lithium (LITHOBID) 300 MG extended release tablet 300 MG IN THE AM  MG AT NIGHT 22   Historical Provider, MD   traZODone (DESYREL) 100 MG tablet TAKE 2 TABLETS (200 MG) BY MOUTH NIGHTLY AS NEEDED FOR SLEEP. 22   Historical Provider, MD       Current medications:    Current Facility-Administered Medications   Medication Dose Route Frequency Provider Last Rate Last Admin    lidocaine PF 1 % injection 1 mL  1 mL IntraDERmal Once PRN Lacho Stafford MD        lactated ringers infusion   IntraVENous Continuous Lacho Stafford MD        sodium chloride flush 0.9 % injection 5-40 mL  5-40 mL IntraVENous 2 times per day Lacho Stafford MD        sodium chloride flush 0.9 % injection 5-40 mL  5-40 mL IntraVENous PRN Lacho Stafford MD        0.9 % sodium chloride infusion   IntraVENous PRN Lacho Stafford MD           Allergies:  No Known Allergies    Problem List:    Patient Active Problem List   Diagnosis Code    Acquired hypothyroidism E03.9    Arthritis M19.90    Depression F32. A    Obesity (BMI 30-39. 9) E66.9    MAURICE (obstructive sleep apnea) G47.33       Past Medical History:  No past medical history on file. Past Surgical History:  No past surgical history on file. Social History:    Social History     Tobacco Use    Smoking status: Former     Packs/day: 1.00     Years: 35.00     Pack years: 35.00     Types: Cigarettes    Smokeless tobacco: Never   Substance Use Topics    Alcohol use: Yes     Alcohol/week: 1.0 standard drink     Types: 1 Cans of beer per week     Comment: weekly                                Counseling given: Not Answered      Vital Signs (Current): There were no vitals filed for this visit.                                            BP Readings from Last 3 Encounters:   07/08/22 133/87   05/27/22 126/74   03/17/22 120/80       NPO Status:                                                                                 BMI:   Wt Readings from Last 3 Encounters:   07/08/22 240 lb (108.9 kg)   06/01/22 238 lb 9.6 oz (108.2 kg)   05/27/22 237 lb (107.5 kg)     There is no height or weight on file to calculate BMI.    CBC:   Lab Results   Component Value Date/Time    WBC 7.4 07/27/2022 12:00 AM    RBC 4.33 07/27/2022 12:00 AM    HGB 13.7 07/27/2022 12:00 AM    HCT 39.5 07/27/2022 12:00 AM    MCV 91 07/27/2022 12:00 AM    RDW 13.2 07/27/2022 12:00 AM     07/27/2022 12:00 AM       CMP:   Lab Results   Component Value Date/Time     07/27/2022 12:00 AM    K 4.2 07/27/2022 12:00 AM     07/27/2022 12:00 AM    CO2 17 07/27/2022 12:00 AM    BUN 14 07/27/2022 12:00 AM    CREATININE 0.85 07/27/2022 12:00 AM    GLUCOSE 101 07/27/2022 12:00 AM    CALCIUM 9.0 07/27/2022 12:00 AM    BILITOT 0.4 07/27/2022 12:00 AM    ALKPHOS 47 07/27/2022 12:00 AM    AST 17 07/27/2022 12:00 AM    ALT 22 07/27/2022 12:00 AM POC Tests: No results for input(s): POCGLU, POCNA, POCK, POCCL, POCBUN, POCHEMO, POCHCT in the last 72 hours. Coags: No results found for: PROTIME, INR, APTT    HCG (If Applicable): No results found for: PREGTESTUR, PREGSERUM, HCG, HCGQUANT     ABGs: No results found for: PHART, PO2ART, HCS1AJT, SFH6MIW, BEART, G5AROKXI     Type & Screen (If Applicable):  No results found for: LABABO, LABRH    Drug/Infectious Status (If Applicable):  No results found for: HIV, HEPCAB    COVID-19 Screening (If Applicable): No results found for: COVID19        Anesthesia Evaluation  Patient summary reviewed and Nursing notes reviewed no history of anesthetic complications:   Airway: Mallampati: II  TM distance: >3 FB   Neck ROM: full  Mouth opening: > = 3 FB   Dental: normal exam         Pulmonary:normal exam  breath sounds clear to auscultation  (+) sleep apnea:                             Cardiovascular:Negative CV ROS            Rhythm: regular  Rate: normal                    Neuro/Psych:   (+) psychiatric history:depression/anxiety             GI/Hepatic/Renal:            ROS comment: Severe obesity. Endo/Other:    (+) hypothyroidism::., .                 Abdominal:   (+) obese,     Abdomen: soft. Vascular: Other Findings:           Anesthesia Plan      MAC     ASA 2             Anesthetic plan and risks discussed with patient. Plan discussed with CRNA.                     Yudi Baron MD   8/5/2022

## 2022-08-05 NOTE — ANESTHESIA POSTPROCEDURE EVALUATION
POST- ANESTHESIA EVALUATION       Pt Name: Shira Marks  MRN: [id number]  [place]: 1970  Date of evaluation: 8/5/2022  Time:  11:00 AM      /81   Pulse 87   Temp 97.7 °F (36.5 °C)   Resp 23   Ht 5' 7\" (1.702 m)   Wt 245 lb 8 oz (111.4 kg)   SpO2 97%   BMI 38.45 kg/m²      Consciousness Level  Awake  Cardiopulmonary Status  Stable  Pain Adequately Treated YES  Nausea / Vomiting  NO  Adequate Hydration  YES  Anesthesia Related Complications NONE      Electronically signed by Virgie Vallejo MD on 8/5/2022 at 11:00 AM       Department of Anesthesiology  Postprocedure Note    Patient: Shira Marks  MRN: [id number]  [place]: 1970  Date of evaluation: 8/5/2022      Procedure Summary     Date: 08/05/22 Room / Location: [address]    Anesthesia Start: 3409 Anesthesia Stop: 1007    Procedure: EGD BIOPSY Diagnosis:       Gastroesophageal reflux disease without esophagitis      Obesity, unspecified classification, unspecified obesity type, unspecified whether serious comorbidity present      (Gastroesophageal reflux disease without esophagitis [K21.9])      (Obesity, unspecified classification, unspecified obesity type, unspecified whether serious comorbidity present [E66.9])    Surgeons: Wood Gibson DO Responsible Provider: Jade Parkinson MD    Anesthesia Type: MAC ASA Status: 2          Anesthesia Type: No value filed.     Cleopatra Phase I:      Cleopatra Phase II: Cleopatra Score: 10      Anesthesia Post Evaluation

## 2022-08-05 NOTE — H&P
Subjective     The patient is a 47 y.o. female who is here to undergo EGD for GERD. Body mass index is 38.45 kg/m². They are considering a bariatric procedure for morbid obesity. Past Medical History:   Diagnosis Date    Depression    . Review of Systems - A complete 14 point review of systems was performed. All was negative unless otherwise documented in HPI. Allergies:  No Known Allergies    Past Surgical History:  Past Surgical History:   Procedure Laterality Date    ANKLE SURGERY      TOTAL HIP ARTHROPLASTY Left     TOTAL HIP ARTHROPLASTY Left     TOTAL KNEE ARTHROPLASTY Right        Family History:  History reviewed. No pertinent family history. Social History:  Social History     Socioeconomic History    Marital status:      Spouse name: Not on file    Number of children: Not on file    Years of education: Not on file    Highest education level: Not on file   Occupational History    Not on file   Tobacco Use    Smoking status: Former     Packs/day: 1.00     Years: 35.00     Pack years: 35.00     Types: Cigarettes    Smokeless tobacco: Never   Substance and Sexual Activity    Alcohol use:  Yes     Alcohol/week: 1.0 standard drink     Types: 1 Cans of beer per week     Comment: weekly    Drug use: Yes     Frequency: 7.0 times per week     Comment: cbd gummies    Sexual activity: Not on file   Other Topics Concern    Not on file   Social History Narrative    Not on file     Social Determinants of Health     Financial Resource Strain: Not on file   Food Insecurity: Not on file   Transportation Needs: Not on file   Physical Activity: Not on file   Stress: Not on file   Social Connections: Not on file   Intimate Partner Violence: Not on file   Housing Stability: Not on file       Current Facility-Administered Medications   Medication Dose Route Frequency Provider Last Rate Last Admin    lidocaine PF 1 % injection 1 mL  1 mL IntraDERmal Once PRN Gilbert Jordan MD        lactated ringers infusion   IntraVENous Continuous Arya Mason  mL/hr at 08/05/22 0907 New Bag at 08/05/22 0907    sodium chloride flush 0.9 % injection 5-40 mL  5-40 mL IntraVENous 2 times per day Arya Mason MD        sodium chloride flush 0.9 % injection 5-40 mL  5-40 mL IntraVENous PRN Arya Mason MD        0.9 % sodium chloride infusion   IntraVENous PRN Arya Mason MD           Objective      Physical Exam  BP (!) 111/92   Pulse 88   Temp 97 °F (36.1 °C) (Infrared)   Resp 16   Ht 5' 7\" (1.702 m)   Wt 245 lb 8 oz (111.4 kg)   SpO2 96%   BMI 38.45 kg/m²    Constitutional:  Vital signs are normal. The patient appears well-developed and well-nourished. HEENT:   Head: Normocephalic. Atraumatic  Eyes: pupils are equal and reactive. No scleral icterus is present. Neck: No mass and no thyromegaly present. Cardiovascular: Normal rate, regular rhythm, S1 normal and S2 normal.    Pulmonary/Chest: Effort normal and breath sounds normal.   Abdominal: Soft. Normal appearance. There is no organomegaly. No tenderness. There is no rigidity, no rebound, no guarding and no Ford's sign. Musculoskeletal:        Right lower leg: Normal. No tenderness and no edema. Left lower leg: Normal. No tenderness and no edema. Lymphadenopathy:     No cervical adenopathy, No Exrtemity Adenopathy. Neurological: The patient is alert and oriented. Skin: Skin is warm, dry and intact. Psychiatric: The patient has a normal mood and affect.  Speech is normal and behavior is normal. Judgment and thought content normal. Cognition and memory are normal.     Assessment     Patient Active Problem List   Diagnosis    Acquired hypothyroidism    Arthritis    Depression    Obesity (BMI 30-39.9)    MAURICE (obstructive sleep apnea)       Plan   EGD

## 2022-08-05 NOTE — DISCHARGE INSTRUCTIONS
POST-ENDOSCOPY INSTRUCTIONS    1. ACTIVITY   No driving, operating machinery, or making important decisions for 24 hours. Resume normal activity after 24 hours. You may return to work after 24 hours. 2. DIET    EGD: Resume your usual diet unless specified below. 3. MEDICATIONS  (Do not consume alcohol, tranquilizers, or sleeping medications for 24 hours unless advised by your physician)                 Resume your usual medications    4. PHYSICIAN FOLLOW-UP                 Please continue with your previously scheduled appointments                 See your primary care physician as planned. 5. NORMAL CHANGES YOU MAY EXPERIENCE AFTER ENDOSCOPY:      EGD:  Sore throat, some abdominal pain and cramping. 6. CALL YOUR PHYSICIAN IF YOU EXPERIENCE ANY OF THE FOLLOWING      A. Passing blood rectally or vomiting blood (color may be red or black)      B. Severe abdominal pain or tenderness (that is not relieved by passing air)      C.   Fever, chills, or excessive sweating      D.  Persistent nausea or vomiting      E.  Redness or swelling at the IV site    If you have additional questions, PLEASE call your doctor or the [facility] center at [phone]

## 2022-08-09 LAB — SURGICAL PATHOLOGY REPORT: NORMAL

## 2022-08-11 ENCOUNTER — OFFICE VISIT (OUTPATIENT)
Dept: BARIATRICS/WEIGHT MGMT | Age: 52
End: 2022-08-11
Payer: COMMERCIAL

## 2022-08-11 VITALS
DIASTOLIC BLOOD PRESSURE: 88 MMHG | HEART RATE: 95 BPM | WEIGHT: 249 LBS | HEIGHT: 66 IN | BODY MASS INDEX: 40.02 KG/M2 | SYSTOLIC BLOOD PRESSURE: 126 MMHG

## 2022-08-11 DIAGNOSIS — E66.01 OBESITY, CLASS III, BMI 40-49.9 (MORBID OBESITY) (HCC): ICD-10-CM

## 2022-08-11 DIAGNOSIS — E03.9 ACQUIRED HYPOTHYROIDISM: ICD-10-CM

## 2022-08-11 DIAGNOSIS — M19.90 ARTHRITIS: ICD-10-CM

## 2022-08-11 DIAGNOSIS — K21.00 GASTROESOPHAGEAL REFLUX DISEASE WITH ESOPHAGITIS WITHOUT HEMORRHAGE: ICD-10-CM

## 2022-08-11 DIAGNOSIS — R79.0 RAISED SERUM IRON: ICD-10-CM

## 2022-08-11 DIAGNOSIS — G47.33 OSA (OBSTRUCTIVE SLEEP APNEA): Primary | ICD-10-CM

## 2022-08-11 DIAGNOSIS — F32.A DEPRESSION, UNSPECIFIED DEPRESSION TYPE: ICD-10-CM

## 2022-08-11 PROBLEM — E66.813 OBESITY, CLASS III, BMI 40-49.9 (MORBID OBESITY) (HCC): Status: ACTIVE | Noted: 2022-08-11

## 2022-08-11 PROCEDURE — 99213 OFFICE O/P EST LOW 20 MIN: CPT | Performed by: NURSE PRACTITIONER

## 2022-08-11 RX ORDER — BUSPIRONE HYDROCHLORIDE 15 MG/1
TABLET ORAL
COMMUNITY
Start: 2022-08-08

## 2022-08-11 NOTE — PROGRESS NOTES
Medical Weight Management Progress Note    Subjective      Patient being seen for medically supervised weight loss for the chronic conditions of MAURICE, Depression ([facility] in [place], [place]), Arthritis, Hypothyroidism. She is working on the behavior changes discussed at the initial appointment. Patient continues on diet plan. Physical activity includes walking. Weight gain of 9 lbs since last visit. Psych eval completed and clearance received. Sleep study completed and dx MAURICE, needs CPAP. Needs pulmonary clearance. EGD completed and revealed severe esophagitis. Taking PPI. Procedure changed from sleeve to bypass. No current issues. Working toward bariatric surgery:    [] Sleeve Gastrectomy                                                           [x] Gordy-en-Y Gastric Bypass    Allergies:  No Known Allergies    Past Medical History:     Past Medical History:   Diagnosis Date    Depression    . Past Surgical History:  Past Surgical History:   Procedure Laterality Date    ANKLE SURGERY      ESOPHAGOGASTRODUODENOSCOPY      TOTAL HIP ARTHROPLASTY Left     TOTAL HIP ARTHROPLASTY Left     TOTAL KNEE ARTHROPLASTY Right     UPPER GASTROINTESTINAL ENDOSCOPY N/A 8/5/2022    [facility] [address] [facility]       Family History:  History reviewed. No pertinent family history. Social History:  Social History     Socioeconomic History    Marital status:      Spouse name: Not on file    Number of children: Not on file    Years of education: Not on file    Highest education level: Not on file   Occupational History    Not on file   Tobacco Use    Smoking status: Former     Packs/day: 1.00     Years: 35.00     Pack years: 35.00     Types: Cigarettes    Smokeless tobacco: Never   Substance and Sexual Activity    Alcohol use:  Yes     Alcohol/week: 1.0 standard drink     Types: 1 Cans of beer per week     Comment: weekly    Drug use: Yes     Frequency: 7.0 times per week     Comment: cbd gummies    Sexual activity: Not on file   Other Topics Concern    Not on file   Social History Narrative    Not on file     Social Determinants of Health     Financial Resource Strain: Not on file   Food Insecurity: Not on file   Transportation Needs: Not on file   Physical Activity: Not on file   Stress: Not on file   Social Connections: Not on file   Intimate Partner Violence: Not on file   Housing Stability: Not on file       Current Medications:  Current Outpatient Medications   Medication Sig Dispense Refill    busPIRone (BUSPAR) 15 MG tablet TAKE 1 TABLET BY MOUTH EVERY DAY AT NIGHT      pantoprazole (PROTONIX) 20 MG tablet Take 1 tablet by mouth in the morning. 30 tablet 3    ARIPiprazole (ABILIFY) 5 MG tablet TAKE 1 TABLET BY MOUTH EVERY DAY      buPROPion (WELLBUTRIN SR) 100 MG extended release tablet Take 100 mg by mouth 2 times daily      etonogestrel-ethinyl estradiol (NUVARING) 0.12-0.015 MG/24HR vaginal ring INSERT 1 RING VAGINALLY EVERY 4 WEEKS, LEAVE IN PLACE 3 WEEKS, THEN REMOVE      levothyroxine (SYNTHROID) 88 MCG tablet TAKE 1 TABLET BY MOUTH EVERY DAY      lithium (LITHOBID) 300 MG extended release tablet 300 MG IN THE AM  MG AT NIGHT       No current facility-administered medications for this visit. Vital Signs:  /88 (Site: Right Upper Arm, Position: Sitting, Cuff Size: Large Adult)   Pulse 95   Ht 5' 5.5\" (1.664 m)   Wt 249 lb (112.9 kg)   BMI 40.81 kg/m²     BMI/Height/Weight:  Body mass index is 40.81 kg/m². Review of Systems - A review of systems was performed. All was negative unless otherwise documented in HPI. Constitutional: Negative for fever, chills. HENT: Negative for trouble swallowing. Eyes: Negative for visual disturbance. Respiratory: Negative for cough, shortness of breath. Cardiovascular: Negative for chest pain and palpitations.    Gastrointestinal: Negative for nausea, vomiting, abdominal pain, diarrhea, constipation, blood in stool and abdominal distention. Endocrine: Negative for polydipsia, polyphagia and polyuria. Genitourinary: Negative for dysuria, frequency, hematuria and difficulty urinating. Musculoskeletal: Negative for myalgias, joint swelling. Skin: Negative for pallor and rash. Neurological: Negative for dizziness, tremors, light-headedness and headaches. Psychiatric/Behavioral: Negative for sleep disturbance and dysphoric mood. Objective:      Physical Exam   Vital signs reviewed. General: Well-developed and well-nourished. No acute distress. Skin: Warm, dry and intact. HEENT: Normocephalic. EOMs intact. Conjunctivae normal. Neck supple. Cardiovascular: Normal rate, regular rhythm. Pulmonary/Chest: Normal effort. Lungs clear to auscultation. No rales, rhonchi or wheezing. Abdominal: Positive bowel sounds. Soft, nontender. Nondistended. Musculoskeletal: Movement x4. No edema. Neurological: Gait normal. Alert and oriented to person, place, and time. Psychiatric: Normal mood and affect. Speech and behavior normal. Judgment and thought content normal. Cognition and memory intact. Assessment:       Diagnosis Orders   1. MAURICE (obstructive sleep apnea)        2. Raised serum iron  Iron and TIBC      3. Acquired hypothyroidism        4. Arthritis        5. Depression, unspecified depression type        6. Obesity, Class III, BMI 40-49.9 (morbid obesity) ([facility] [address]        7. Gastroesophageal reflux disease with esophagitis without hemorrhage            Plan:    Dietitian visit today. Patient was encouraged to journal all food intake. Keep calorie level at approximately 7217-9923. Protein intake is to be a minimum of 60-80 grams per day. Water drinking was encouraged with a goal of 64oz-128oz daily. Beverages to be calorie free except for milk. Every other beverage should be water. Avoid soda. Continue to increase level of physical activity.   Encouraged use of exercise log.    Patients will undergo thorough nutritional evaluation and counseling with our registered dietician. Our program provides long term nutritional counseling with unlimited consults with the dietician. All female patients have been educated on the importance of using reliable birth control and avoiding pregnancy the first 18 months - 2 years following bariatric surgery. All female patient will have a pregnancy test done with the pre-admission testing. All patients are nicotine tested and require a negative nicotine level prior to surgery. Patient has been educated about the risks associated with substance use, as well as the risks of using nicotine and alcohol after surgery. Patient has been educated that theses substances need to be avoided lifelong after surgery to reduce the risk of complications and sub-optimal weight loss. EGD report reviewed. Revealed severe esophagitis. H Pylori negative. Procedure changed to bypass per surgeon. Labs reviewed and discussed with patient. Lipids mildly elevated. TSH elevated. Endocrinologist is titrating thyroid med up. Iron level elevated. Ferritin WNL. Recheck iron in 1-2 months. Follow-up  Return in about 1 month (around 9/11/2022). Orders this encounter:  Orders Placed This Encounter   Procedures    Iron and TIBC     Standing Status:   Future     Standing Expiration Date:   8/11/2023     Order Specific Question:   Is Patient Fasting? Answer:   Yes     Order Specific Question:   No of Hours? Answer:   12 hours       Prescriptions this encounter:  No orders of the defined types were placed in this encounter.       Electronically signed by:  Mary Pittman CNP

## 2022-08-11 NOTE — PROGRESS NOTES
16 I will eat slowly during meals and snacks. 17 I will limit fluids 4oz before after and during meals. 18 I will eat protein first at all meals followed by vegetables,  Fruit and lastly whole grains. 2  19 My first weight neutral approach is:  I will eat breakfast.   100              20 My second weight neutral approach is:                 21  My Thirds weight neutral approach is:                 22                  23                  24                  25                    Questions asked for goal understanding:                                                  Do you understand your goals? Do you have the information you need to achieve your goals? Do you have any questions  right now? []  Consistent goal achievement in the program thus far and further success with goals is expected. []  Unable to consistently make progress in goal achievement. At this time patient is not moving forward  in developing the skills needed for success after surgery. Plan:    Continue to follow monthly and review goals.          []  Nutrition visits complete    []

## 2022-09-02 ENCOUNTER — OFFICE VISIT (OUTPATIENT)
Dept: BARIATRICS/WEIGHT MGMT | Age: 52
End: 2022-09-02
Payer: COMMERCIAL

## 2022-09-02 VITALS
DIASTOLIC BLOOD PRESSURE: 88 MMHG | WEIGHT: 253 LBS | SYSTOLIC BLOOD PRESSURE: 121 MMHG | BODY MASS INDEX: 40.66 KG/M2 | HEART RATE: 96 BPM | HEIGHT: 66 IN

## 2022-09-02 DIAGNOSIS — G47.33 OSA (OBSTRUCTIVE SLEEP APNEA): Primary | ICD-10-CM

## 2022-09-02 DIAGNOSIS — E66.01 OBESITY, CLASS III, BMI 40-49.9 (MORBID OBESITY) (HCC): ICD-10-CM

## 2022-09-02 DIAGNOSIS — E03.9 ACQUIRED HYPOTHYROIDISM: ICD-10-CM

## 2022-09-02 DIAGNOSIS — F32.A DEPRESSION, UNSPECIFIED DEPRESSION TYPE: ICD-10-CM

## 2022-09-02 DIAGNOSIS — M19.90 ARTHRITIS: ICD-10-CM

## 2022-09-02 DIAGNOSIS — D50.9 IRON DEFICIENCY ANEMIA, UNSPECIFIED IRON DEFICIENCY ANEMIA TYPE: ICD-10-CM

## 2022-09-02 DIAGNOSIS — K21.00 GASTROESOPHAGEAL REFLUX DISEASE WITH ESOPHAGITIS WITHOUT HEMORRHAGE: ICD-10-CM

## 2022-09-02 PROCEDURE — 99213 OFFICE O/P EST LOW 20 MIN: CPT | Performed by: NURSE PRACTITIONER

## 2022-09-02 RX ORDER — FERROUS SULFATE 325(65) MG
325 TABLET ORAL
Qty: 30 TABLET | Refills: 3 | Status: SHIPPED | OUTPATIENT
Start: 2022-09-02 | End: 2022-09-02 | Stop reason: ALTCHOICE

## 2022-09-02 RX ORDER — CLONAZEPAM 0.5 MG/1
0.5 TABLET ORAL NIGHTLY PRN
COMMUNITY
Start: 2022-08-18 | End: 2023-08-18

## 2022-09-02 NOTE — PROGRESS NOTES
Medical Weight Management Progress Note    Subjective      Patient being seen for medically supervised weight loss for the chronic conditions of MAURICE, Depression ([facility] in [place], [place]), Arthritis, Hypothyroidism. She is working on the behavior changes discussed at the initial appointment. Patient continues on diet plan. Physical activity includes biking. Weight gain of 4 lbs since last visit. Psych eval completed and clearance received. Sleep study completed and dx MAURICE, needs CPAP. Needs pulmonary clearance. EGD completed and revealed severe esophagitis. Taking PPI. Procedure changed from sleeve to bypass. No current issues. Working toward bariatric surgery:    [] Sleeve Gastrectomy                                                           [x] Gordy-en-Y Gastric Bypass    Allergies:  No Known Allergies    Past Medical History:     Past Medical History:   Diagnosis Date    Depression    . Past Surgical History:  Past Surgical History:   Procedure Laterality Date    ANKLE SURGERY      ESOPHAGOGASTRODUODENOSCOPY      TOTAL HIP ARTHROPLASTY Left     TOTAL HIP ARTHROPLASTY Left     TOTAL KNEE ARTHROPLASTY Right     UPPER GASTROINTESTINAL ENDOSCOPY N/A 8/5/2022    [facility] [address] [facility]       Family History:  History reviewed. No pertinent family history. Social History:  Social History     Socioeconomic History    Marital status:      Spouse name: Not on file    Number of children: Not on file    Years of education: Not on file    Highest education level: Not on file   Occupational History    Not on file   Tobacco Use    Smoking status: Former     Packs/day: 1.00     Years: 35.00     Pack years: 35.00     Types: Cigarettes    Smokeless tobacco: Never   Substance and Sexual Activity    Alcohol use:  Yes     Alcohol/week: 1.0 standard drink     Types: 1 Cans of beer per week     Comment: weekly    Drug use: Yes     Frequency: 7.0 times per week     Comment: cbd gummies    Sexual activity: Not on file   Other Topics Concern    Not on file   Social History Narrative    Not on file     Social Determinants of Health     Financial Resource Strain: Not on file   Food Insecurity: Not on file   Transportation Needs: Not on file   Physical Activity: Not on file   Stress: Not on file   Social Connections: Not on file   Intimate Partner Violence: Not on file   Housing Stability: Not on file       Current Medications:  Current Outpatient Medications   Medication Sig Dispense Refill    clonazePAM (KLONOPIN) 0.5 MG tablet Take 0.5 mg by mouth nightly as needed. busPIRone (BUSPAR) 15 MG tablet TAKE 1 TABLET BY MOUTH EVERY DAY AT NIGHT      pantoprazole (PROTONIX) 20 MG tablet Take 1 tablet by mouth in the morning. 30 tablet 3    ARIPiprazole (ABILIFY) 5 MG tablet TAKE 1 TABLET BY MOUTH EVERY DAY      buPROPion (WELLBUTRIN SR) 100 MG extended release tablet Take 100 mg by mouth 2 times daily      etonogestrel-ethinyl estradiol (NUVARING) 0.12-0.015 MG/24HR vaginal ring INSERT 1 RING VAGINALLY EVERY 4 WEEKS, LEAVE IN PLACE 3 WEEKS, THEN REMOVE      levothyroxine (SYNTHROID) 88 MCG tablet TAKE 1 TABLET BY MOUTH EVERY DAY      lithium (LITHOBID) 300 MG extended release tablet 300 MG IN THE AM  MG AT NIGHT       No current facility-administered medications for this visit. Vital Signs:  /88 (Site: Right Upper Arm, Position: Sitting, Cuff Size: Large Adult)   Pulse 96   Ht 5' 5.5\" (1.664 m)   Wt 253 lb (114.8 kg)   BMI 41.46 kg/m²     BMI/Height/Weight:  Body mass index is 41.46 kg/m². Review of Systems - A review of systems was performed. All was negative unless otherwise documented in HPI. Constitutional: Negative for fever, chills. HENT: Negative for trouble swallowing. Eyes: Negative for visual disturbance. Respiratory: Negative for cough, shortness of breath.    Cardiovascular: Negative for chest pain and palpitations. Gastrointestinal: Negative for nausea, vomiting, abdominal pain, diarrhea, constipation, blood in stool and abdominal distention. Endocrine: Negative for polydipsia, polyphagia and polyuria. Genitourinary: Negative for dysuria, frequency, hematuria and difficulty urinating. Musculoskeletal: Negative for myalgias, joint swelling. Skin: Negative for pallor and rash. Neurological: Negative for dizziness, tremors, light-headedness and headaches. Psychiatric/Behavioral: Negative for sleep disturbance and dysphoric mood. Objective:      Physical Exam   Vital signs reviewed. General: Well-developed and well-nourished. No acute distress. Skin: Warm, dry and intact. HEENT: Normocephalic. EOMs intact. Conjunctivae normal. Neck supple. Cardiovascular: Normal rate, regular rhythm. Pulmonary/Chest: Normal effort. Lungs clear to auscultation. No rales, rhonchi or wheezing. Abdominal: Positive bowel sounds. Soft, nontender. Nondistended. Musculoskeletal: Movement x4. No edema. Neurological: Gait normal. Alert and oriented to person, place, and time. Psychiatric: Normal mood and affect. Speech and behavior normal. Judgment and thought content normal. Cognition and memory intact. Assessment:       Diagnosis Orders   1. MAURICE (obstructive sleep apnea)        2. Acquired hypothyroidism        3. Arthritis        4. Gastroesophageal reflux disease with esophagitis and hemorrhage        5. Obesity, Class III, BMI 40-49.9 (morbid obesity) ([facility] [address]        6. Depression, unspecified depression type        7. Iron deficiency anemia, unspecified iron deficiency anemia type  DISCONTINUED: ferrous sulfate (IRON 325) 325 (65 Fe) MG tablet          Plan:    Dietitian visit today. Patient was encouraged to journal all food intake. Keep calorie level at approximately 6851-0422. Protein intake is to be a minimum of 60-80 grams per day. Water drinking was encouraged with a goal of 64oz-128oz daily. Beverages to be calorie free except for milk. Every other beverage should be water. Avoid soda. Continue to increase level of physical activity. Encouraged use of exercise log. Patients will undergo thorough nutritional evaluation and counseling with our registered dietician. Our program provides long term nutritional counseling with unlimited consults with the dietician. All female patients have been educated on the importance of using reliable birth control and avoiding pregnancy the first 18 months - 2 years following bariatric surgery. All female patient will have a pregnancy test done with the pre-admission testing. All patients are nicotine tested and require a negative nicotine level prior to surgery. Patient has been educated about the risks associated with substance use, as well as the risks of using nicotine and alcohol after surgery. Patient has been educated that theses substances need to be avoided lifelong after surgery to reduce the risk of complications and sub-optimal weight loss. EGD report reviewed. Revealed severe esophagitis. H Pylori negative. Procedure changed to bypass per surgeon. Recheck Iron level from 8/31/22 was normal.  Discontinue iron for now. Will monitor. Weight gain discussed. She would like to start the pre-op diet for weight loss. Weight recheck with the dietitian in 2 weeks. States that she has been overeating at night. She does not feel as if she has disordered eating, but works with her therapist on overeating. Referral to eating disorder specialist, Dr Angella Reid, offered, but patient declines. Follow-up  Return in about 1 month (around 10/2/2022). Orders this encounter:  No orders of the defined types were placed in this encounter.       Prescriptions this encounter:  Orders Placed This Encounter   Medications    DISCONTD: ferrous sulfate (IRON 325) 325 (65 Fe) MG tablet     Sig: Take 1 tablet by mouth daily (with breakfast)     Dispense:  30 tablet     Refill:  3         Electronically signed by:  Demond Lincoln CNP

## 2022-09-02 NOTE — PROGRESS NOTES
Medical Nutrition Therapy   Metabolic and Bariatric Surgery         Supervised diet and exercise preparation  Visit 3 out of 3  Pt reports:      Changes in eating patterns to promote health are noted below on the goals number 19-22    Vitals: Wt Readings from Last 3 Encounters:   09/02/22 253 lb (114.8 kg)   08/11/22 249 lb (112.9 kg)   08/05/22 245 lb 8 oz (111.4 kg)         Nutrition Assessment:   PES: Knowledge deficit related to healthy behaviors that support weight management post weight loss surgery as evidenced by Body mass index is 41.46 kg/m². Nutrition Assessment of Goal Attainment:  TREATMENT GOALS:    1. Pt  Completed 5 out of 5 goals. 2.TREATMENT GOALS FOR UPCOMING WEEK: continue all previous goals and add: # 0    All goals were planned with and agreed on by the patient. I want to improve my health because I want to be there for my kids. appt # NA G What is your next step? C 1 2 3 4 5 6 7 8 9     0  1 I will read the education binder provided to me and the   x 100             0  2 I will make my pschological evaluation appoinment. x 100             2  3 I will bring this goal card to every appointment. 100 100 100            x 4 I will eliminate all tobacco/nicotine. x 5 I will limit alcoholic beverages to 0-8EC per week. 2  6 I will limit dining out to 3 times per week or less. x   100            x 7 I will eliminate sugary beverages. x 8 I will eliminate carbonated beverages. 2  9 I will eliminate drinking with a straw. x   100            x 10 I will limit caffeinated beverages to 16oz daily. 2  11 I will limit log my exercise daily. 100 100 100           2  12 I will determine my calcium and mvi plan. x   100           1  13 I will have 1-2 servings of lean protein present at each meal and minimeal. x  100            1  14 I will eat every 3-5 hours. x  100              15 I will drink 64oz of fluid daily.  x 16 I will eat slowly during meals and snacks. x                17 I will limit fluids 4oz before after and during meals. x                18 I will eat protein first at all meals followed by vegetables,  Fruit and lastly whole grains. x              2  19 My first weight neutral approach is:  I will eat breakfast.   100              20 My second weight neutral approach is:                 21  My Thirds weight neutral approach is:                 22                  23                  24                  25                    Questions asked for goal understanding:                                                  Do you understand your goals? Do you have the information you need to achieve your goals? Do you have any questions  right now? []  Consistent goal achievement in the program thus far and further success with goals is expected. []  Unable to consistently make progress in goal achievement. At this time patient is not moving forward  in developing the skills needed for success after surgery. Plan:    Continue to follow monthly and review goals.          []  Nutrition visits complete    [x]

## 2022-09-15 ENCOUNTER — NURSE ONLY (OUTPATIENT)
Dept: BARIATRICS/WEIGHT MGMT | Age: 52
End: 2022-09-15

## 2022-09-15 ENCOUNTER — TELEPHONE (OUTPATIENT)
Dept: BARIATRICS/WEIGHT MGMT | Age: 52
End: 2022-09-15

## 2022-09-15 VITALS — BODY MASS INDEX: 39.33 KG/M2 | WEIGHT: 240 LBS

## 2022-09-15 NOTE — PROGRESS NOTES
Weight loss prior to surgery  Visit number:  4         Next visit date:  10/13/2022    Nutrition goals complete:    Yes    Initial Weight:  240    Wt Readings from Last 3 Encounters:   09/15/22 240 lb (108.9 kg)   09/02/22 253 lb (114.8 kg)   08/11/22 249 lb (112.9 kg)     Through following the bariatric pre-op diet, she lost 13 lbs over 2 weeks. Is patient weight below start weight at todays visit:    Yes    Message routed to provider and follow up per protocol.

## 2022-09-15 NOTE — TELEPHONE ENCOUNTER
----- Message from Jahaira Lopez, MS, RD, LD sent at 9/15/2022  2:08 PM EDT -----  Please see recent note; patient is at start weight and nutrition goals complete.      Thanks,     Anay

## 2022-09-19 NOTE — TELEPHONE ENCOUNTER
Patient's record has been submitted to the insurance company on 9/19/22 for authorization to schedule surgery. Instructions to patient: if patient calls for status on authorization to schedule surgery please instruct patient that it could take up to 30 days for an authorization. Once authorization is received the insurance specialists will contact the patient to schedule their procedure. Program fee paid in full yes    Called pre-cert at [phone] spoke with Traci Jimenez -   Request clinicals be faxed to [phone] attn:  hospital admissions. I will take up to 15 business days for authorization.

## 2022-11-15 ENCOUNTER — TELEPHONE (OUTPATIENT)
Dept: BARIATRICS/WEIGHT MGMT | Age: 52
End: 2022-11-15

## 2022-11-15 NOTE — TELEPHONE ENCOUNTER
Spoke with Jaz Peralta at [facility] \"prompt #6\"  Jaz Peralta approved case (at first she could not find the records) she came back and states the case will be approved for CPT 05137  Will call on 11/16/22 with Case reference #  [id number] option 6 needs to be called with exact date of surgery.       Spoke with patient she will be scheduled for 12/13/22

## 2022-11-16 NOTE — TELEPHONE ENCOUNTER
Received call from [facility] at [address] prompt #6  Authorization # [id number] for RYGB

## 2022-11-21 RX ORDER — SODIUM CHLORIDE, SODIUM LACTATE, POTASSIUM CHLORIDE, CALCIUM CHLORIDE 600; 310; 30; 20 MG/100ML; MG/100ML; MG/100ML; MG/100ML
1000 INJECTION, SOLUTION INTRAVENOUS CONTINUOUS
OUTPATIENT
Start: 2022-11-21

## 2022-11-21 NOTE — DISCHARGE INSTRUCTIONS
Pre-operative Instructions           NOTHING to eat or drink after midnight the night prior to surgery EXCEPT GATORADE PER SURGEON   (This includes gum, candy, mints, chewing tobacco, etc). Please arrive at the surgery center (Entrance B) by 10:30 AM on 12/13/2022 (or as directed by your surgeon's office). See Directons to Surgery Center below. Please take only the following medication(s) the day of surgery with a small sip of water: levothyroxine (Synthroid), pantoprazole (Protonix), Buspar     Please stop any blood thinning medications  AS DIRECTED BY PRESCRIBING PROVIDER! :   Failure to stop these medications as instructed (too soon or too late) may result in injury to you, or your surgery may need to be rescheduled. Below is a list of some examples for your reference. Antiplatelets : (stop blood cells (called platelets) from sticking together and forming a blood clot):   Aspirin (Bufferin, Ecotrin), Clopidogrel (Plavix), Ticagrelor (Brilinta), Prasugrel (Effient), Dipyridamole/aspirin (Aggrenox), Ticlopidine (Ticlid), Eptifibatide (Integrilin)    Anticoagulants: (slow down your body's process of making clots): Warfarin (Coumadin), Rivaroxaban (Xarelto), Dabigatran (Pradaxa), Apixaban (Eliquis), Edoxaban (Savaysa), Heparin/Enoxaparin (Lovenox), Fondaparinux (Arixtra)    NSAIDS: Aspirin (Bufferin, Ecotrin), Ibuprofen (Motrin, Nuprin,Advil), Naproxen (Aleve),Meloxicam (Mobic), Celecoxib (Celebrex), Diclofenac (Voltaren), Etodolac (Lodine), Indomethacin, Ketorolac, Nabumetone, Oxaprozin (Daypro), Piroxicam (Feldene), Excedrin (has aspirin in it)    Herbal supplements: Bromelain, Cinnamon, [employer], [place] (female ginseng), Fish oil, Garlic, Agnes,Ginkgo biloba, Grape seed extract, Turmeric, Vitamin E, etc....)    You may continue the rest of your medications through the night before surgery unless instructed otherwise.     If applicable:   Do not take diabetic medications on the day of surgery. Please use/bring daily inhalers with you     11/28/22  10:51 AM      ___________________  _______________________  Signature (Provider)              Signature (Patient)     Day of Surgery/Procedure    As a patient at [facility] you can expect quality medical and nursing care that is centered on your individual needs. Our goal is to make your surgical experience as comfortable as possible    Directions to the [address] [place] [address] at [address] [place] [address]. Felice is located in the Emergency Room parking lot on [facility] or there is additional parking across the street. The address is [address] [place] [address]. Please check in at the [facility] upon arrival.     Patient Instructions  In case of any illness please contact your surgeons office for instructions prior to coming to the hospital.    Due to current restrictions you are only allowed 2 adults to be with you. Masks are to be worn and screening will take place on arrival.     Bring your current list of medications, vitamins, herbals and anything you might take on an  \"as needed \" basis. It is important we have a correct list with dosages and frequencies. Please verify your list with your medications at home or bring all of your bottles with you. If you have been given a blood band be sure to bring it with you on the day of surgery. Do not put it on or close the clasp. Use and bring any inhalers if you are currently using one. It is ok to brush your teeth but do not swallow any water. You may be required to provide a urine sample upon your arrival to the pre-op area, so please take this into consideration prior to using the restroom. No jewelry or piercing's to be worn into surgery because you might be injured because of them. No contact lenses to be worn. It is ok to wear your glasses in pre op but they will be removed prior to going into the operating room.     Dentures/partials will likely need to be removed in pre op depending on your type of anesthesia, please do not use adhesives on the day of surgery. Bathe as instructed with the special soap given to you. No lotions, powders or creams after bathing. Wear loose comfortable clothing / shoes that are easy to get on and off over wounds or casts. If you are going to be admitted after surgery please bring your Cpap or BiPap if you have it at home. Keep patient belongings to a minimum and leave valuables at home. If you are staying overnight with us, please bring a SMALL bag of personal items. We cannot accommodate large items, like suitcases. If you are going home after anesthesia or sedation then you must have a responsible adult with you to take you home and to be with you for the first 24 hours after surgery. If you do not have someone to stay with you your surgery might get cancelled. Please contact your surgeon's office to see if other arrangements can be made if you can not find someone to stay with you. If you have any other questions on the day of surgery please contact [phone] or [phone]    If you have any other questions regarding your procedure/surgery please call  your surgeon's office.

## 2022-11-28 ENCOUNTER — HOSPITAL ENCOUNTER (OUTPATIENT)
Dept: GENERAL RADIOLOGY | Age: 52
Discharge: HOME | End: 2022-11-30
Payer: COMMERCIAL

## 2022-11-28 ENCOUNTER — HOSPITAL ENCOUNTER (OUTPATIENT)
Dept: PREADMISSION TESTING | Age: 52
Discharge: HOME | End: 2022-12-02
Payer: COMMERCIAL

## 2022-11-28 ENCOUNTER — NURSE ONLY (OUTPATIENT)
Dept: BARIATRICS/WEIGHT MGMT | Age: 52
End: 2022-11-28

## 2022-11-28 VITALS
SYSTOLIC BLOOD PRESSURE: 141 MMHG | DIASTOLIC BLOOD PRESSURE: 89 MMHG | HEIGHT: 65 IN | HEART RATE: 93 BPM | RESPIRATION RATE: 18 BRPM | BODY MASS INDEX: 43.99 KG/M2 | WEIGHT: 264 LBS | OXYGEN SATURATION: 98 % | TEMPERATURE: 97.2 F

## 2022-11-28 LAB
ANION GAP SERPL CALCULATED.3IONS-SCNC: 17 MMOL/L (ref 9–17)
BUN BLDV-MCNC: 16 MG/DL (ref 6–20)
CALCIUM SERPL-MCNC: 9.3 MG/DL (ref 8.6–10.4)
CHLORIDE BLD-SCNC: 106 MMOL/L (ref 98–107)
CO2: 16 MMOL/L (ref 20–31)
CREAT SERPL-MCNC: 0.61 MG/DL (ref 0.5–0.9)
GFR SERPL CREATININE-BSD FRML MDRD: >60 ML/MIN/1.73M2
GLUCOSE BLD-MCNC: 102 MG/DL (ref 70–99)
HCT VFR BLD CALC: 40.5 % (ref 36.3–47.1)
HEMOGLOBIN: 13 G/DL (ref 11.9–15.1)
INR BLD: 0.9
MCH RBC QN AUTO: 29.9 PG (ref 25.2–33.5)
MCHC RBC AUTO-ENTMCNC: 32.1 G/DL (ref 28.4–34.8)
MCV RBC AUTO: 93.1 FL (ref 82.6–102.9)
NRBC AUTOMATED: 0 PER 100 WBC
PARTIAL THROMBOPLASTIN TIME: 21.6 SEC (ref 20.5–30.5)
PDW BLD-RTO: 13 % (ref 11.8–14.4)
PLATELET # BLD: 312 K/UL (ref 138–453)
PMV BLD AUTO: 8.7 FL (ref 8.1–13.5)
POTASSIUM SERPL-SCNC: 4.7 MMOL/L (ref 3.7–5.3)
PROTHROMBIN TIME: 9.9 SEC (ref 9.1–12.3)
RBC # BLD: 4.35 M/UL (ref 3.95–5.11)
SODIUM BLD-SCNC: 139 MMOL/L (ref 135–144)
WBC # BLD: 9.2 K/UL (ref 3.5–11.3)

## 2022-11-28 PROCEDURE — 80048 BASIC METABOLIC PNL TOTAL CA: CPT

## 2022-11-28 PROCEDURE — 85730 THROMBOPLASTIN TIME PARTIAL: CPT

## 2022-11-28 PROCEDURE — 36415 COLL VENOUS BLD VENIPUNCTURE: CPT

## 2022-11-28 PROCEDURE — 85610 PROTHROMBIN TIME: CPT

## 2022-11-28 PROCEDURE — 71046 X-RAY EXAM CHEST 2 VIEWS: CPT

## 2022-11-28 PROCEDURE — G0480 DRUG TEST DEF 1-7 CLASSES: HCPCS

## 2022-11-28 PROCEDURE — 85027 COMPLETE CBC AUTOMATED: CPT

## 2022-11-28 PROCEDURE — 93005 ELECTROCARDIOGRAM TRACING: CPT | Performed by: SURGERY

## 2022-11-28 RX ORDER — ESZOPICLONE 3 MG/1
1 TABLET, FILM COATED ORAL NIGHTLY
COMMUNITY
Start: 2022-10-19

## 2022-11-28 RX ORDER — FERROUS SULFATE 325(65) MG
1 TABLET ORAL DAILY
COMMUNITY
Start: 2022-09-02

## 2022-11-28 ASSESSMENT — PAIN DESCRIPTION - ONSET: ONSET: ON-GOING

## 2022-11-28 ASSESSMENT — PAIN DESCRIPTION - ORIENTATION: ORIENTATION: LEFT;RIGHT

## 2022-11-28 ASSESSMENT — PAIN DESCRIPTION - DESCRIPTORS: DESCRIPTORS: ACHING

## 2022-11-28 ASSESSMENT — PAIN DESCRIPTION - FREQUENCY: FREQUENCY: INTERMITTENT

## 2022-11-28 ASSESSMENT — PAIN DESCRIPTION - LOCATION: LOCATION: HIP;KNEE

## 2022-11-28 NOTE — H&P (VIEW-ONLY)
History and Physical    Pt Name: Edwin Abbott  MRN: [id number]  YOB: 1970  Date of evaluation: 11/28/2022  Primary Care Physician: Lamonte Boothe PA-C, ALVAREZ    SUBJECTIVE:   History of Chief Complaint:    Edwin Abbott is a 47 y.o. female who presents for PAT appointment. Patient complains of obesity as a lifelong issue, MAURICE, GERD. She has opted for surgical intervention. Patient has been scheduled for XI ROBOTIC LAPAROSCOPIC GASTRIC BYPASS ROLANDO-EN-Y EGD, LIVER BIOPSY   Allergies  has No Known Allergies. Medications  Prior to Admission medications    Medication Sig Start Date End Date Taking? Authorizing Provider   eszopiclone (LUNESTA) 3 MG TABS Take 1 tablet by mouth nightly. 10/19/22   Historical Provider, MD   ferrous sulfate (IRON 325) 325 (65 Fe) MG tablet Take 1 tablet by mouth daily 9/2/22   Historical Provider, MD   clonazePAM (KLONOPIN) 0.5 MG tablet Take 0.5 mg by mouth nightly as needed. Patient not taking: Reported on 11/28/2022 8/18/22 8/18/23  Historical Provider, MD   busPIRone (BUSPAR) 15 MG tablet TAKE 1 TABLET BY MOUTH EVERY DAY AT NIGHT  Patient not taking: Reported on 11/28/2022 8/8/22   Historical Provider, MD   pantoprazole (PROTONIX) 20 MG tablet Take 1 tablet by mouth in the morning.  8/5/22   Geeta Ndiaye MD   ARIPiprazole (ABILIFY) 5 MG tablet TAKE 1 TABLET BY MOUTH EVERY DAY 2/4/22   Historical Provider, MD   buPROPion [facility] SR) 100 MG extended release tablet Take 100 mg by mouth 2 times daily 2/17/22 2/17/23  Historical Provider, MD   etonogestrel-ethinyl estradiol (NUVARING) 0.12-0.015 MG/24HR vaginal ring INSERT 1 RING VAGINALLY EVERY 4 WEEKS, LEAVE IN PLACE 3 WEEKS, THEN REMOVE 1/5/22   Historical Provider, MD   levothyroxine (SYNTHROID) 88 MCG tablet Take 88 mcg by mouth Daily M-W-F-two tablets   T-TH-S-S -one tablet 3/3/22   Historical Provider, MD   lithium (LITHOBID) 300 MG extended release tablet 300 MG IN THE AM  MG AT NIGHT 2/17/22   Historical Provider, MD traZODone (DESYREL) 100 MG tablet TAKE 2 TABLETS (200 MG) BY MOUTH NIGHTLY AS NEEDED FOR SLEEP. Patient not taking: Reported on 2022  Historical Provider, MD     Past Medical History    has a past medical history of Anxiety, Arthritis, Asthma, Depression, GERD (gastroesophageal reflux disease), Obesity, MAURICE on CPAP, Thyroid disease, Under care of service provider, Under care of service provider, and Under care of service provider. Past Surgical History   has a past surgical history that includes Ankle surgery (); Total knee arthroplasty (Right, 2018); Total hip arthroplasty (Left, ); Total hip arthroplasty (Right, ); Esophagogastroduodenoscopy; Upper gastrointestinal endoscopy (N/A, 2022); Tubal ligation (); and Endometrial ablation (). Social History   reports that she quit smoking about 2 years ago. Her smoking use included cigarettes. She has a 35.00 pack-year smoking history. She has never used smokeless tobacco.    reports current alcohol use of about 1.0 standard drink per week. reports that she does not currently use drugs. Marital Status    Children 2  Occupation disabled   Family History  Family Status   Relation Name Status    Mother  Alive    Father       family history includes Epilepsy in her father; No Known Problems in her mother.     Review of Systems:  CONSTITUTIONAL:   negative for fevers, chills, fatigue and malaise    EYES:   negative for double vision, blurred vision and photophobia    HEENT:   negative for tinnitus, epistaxis and sore throat     RESPIRATORY:   negative for cough, shortness of breath, wheezing     CARDIOVASCULAR:   negative for chest pain, palpitations, syncope, edema     GASTROINTESTINAL:   negative for nausea, vomiting     GENITOURINARY:   negative for incontinence     MUSCULOSKELETAL:   negative for neck or back pain     NEUROLOGICAL:   Negative for weakness and tingling  negative for headaches and dizziness PSYCHIATRIC:   negative for anxiety       OBJECTIVE:   VITALS:  height is 5' 5\" (1.651 m) and weight is 264 lb (119.7 kg). Her temporal temperature is 97.2 °F (36.2 °C). Her blood pressure is 141/89 (abnormal) and her pulse is 93. Her respiration is 18 and oxygen saturation is 98%. CONSTITUTIONAL:alert & oriented x 3, no acute distress. Friendly. Very pleasant. SKIN:  Warm and dry, no rashes on exposed areas of skin   HEAD:  Normocephalic, atraumatic   EYES: EOMs intact. PERRL. EARS:  Equal bilaterally, no edema or thickening, skin is intact without lumps or lesions. No discharge. Hearing grossly WNL. NOSE:  Nares patent. No rhinorrhea   MOUTH/THROAT:  Mucous membranes dry, uvula midline, teeth appear to be intact  NECK:full ROM  LUNGS: Respirations even and non-labored. Clear to auscultation bilaterally, no wheezes, rales, or rhonchi. CARDIOVASCULAR: Regular rate and rhythm, no murmurs/rubs/gallops   ABDOMEN: soft, non-tender, non-distended, bowel sounds active x 4 , obese  EXTREMITIES: No edema bilateral lower extremities. No varicosities bilateral lower extremities. NEUROLOGIC: CN II-XII are grossly intact.  Gait is smooth, rhythmic and effortless   Testing:   EK22  Labs pending: drawn 2022   Chest XRay:  22  IMPRESSIONS:   Obesity, MAURICE, GERD  PLANS:   XI ROBOTIC LAPAROSCOPIC GASTRIC BYPASS ROLANDO-EN-Y EGD, LIVER BIOPSY     JANEE DANIEL CNP  Electronically signed 2022 at 11:46 AM

## 2022-11-28 NOTE — H&P
History and Physical    Pt Name: Deidre Chery  MRN: [id number]  YOB: 1970  Date of evaluation: 11/28/2022  Primary Care Physician: Gustavo Motta PA-C, ALVAREZ    SUBJECTIVE:   History of Chief Complaint:    Deidre Chery is a 47 y.o. female who presents for PAT appointment. Patient complains of obesity as a lifelong issue, MAURICE, GERD. She has opted for surgical intervention. Patient has been scheduled for XI ROBOTIC LAPAROSCOPIC GASTRIC BYPASS ROLANDO-EN-Y EGD, LIVER BIOPSY   Allergies  has No Known Allergies. Medications  Prior to Admission medications    Medication Sig Start Date End Date Taking? Authorizing Provider   eszopiclone (LUNESTA) 3 MG TABS Take 1 tablet by mouth nightly. 10/19/22   Historical Provider, MD   ferrous sulfate (IRON 325) 325 (65 Fe) MG tablet Take 1 tablet by mouth daily 9/2/22   Historical Provider, MD   clonazePAM (KLONOPIN) 0.5 MG tablet Take 0.5 mg by mouth nightly as needed. Patient not taking: Reported on 11/28/2022 8/18/22 8/18/23  Historical Provider, MD   busPIRone (BUSPAR) 15 MG tablet TAKE 1 TABLET BY MOUTH EVERY DAY AT NIGHT  Patient not taking: Reported on 11/28/2022 8/8/22   Historical Provider, MD   pantoprazole (PROTONIX) 20 MG tablet Take 1 tablet by mouth in the morning.  8/5/22   Merlinda Howell, MD   ARIPiprazole (ABILIFY) 5 MG tablet TAKE 1 TABLET BY MOUTH EVERY DAY 2/4/22   Historical Provider, MD   buPROPion [facility] SR) 100 MG extended release tablet Take 100 mg by mouth 2 times daily 2/17/22 2/17/23  Historical Provider, MD   etonogestrel-ethinyl estradiol (NUVARING) 0.12-0.015 MG/24HR vaginal ring INSERT 1 RING VAGINALLY EVERY 4 WEEKS, LEAVE IN PLACE 3 WEEKS, THEN REMOVE 1/5/22   Historical Provider, MD   levothyroxine (SYNTHROID) 88 MCG tablet Take 88 mcg by mouth Daily M-W-F-two tablets   T-TH-S-S -one tablet 3/3/22   Historical Provider, MD   lithium (LITHOBID) 300 MG extended release tablet 300 MG IN THE AM  MG AT NIGHT 2/17/22   Historical Provider, MD traZODone (DESYREL) 100 MG tablet TAKE 2 TABLETS (200 MG) BY MOUTH NIGHTLY AS NEEDED FOR SLEEP. Patient not taking: Reported on 2022  Historical Provider, MD     Past Medical History    has a past medical history of Anxiety, Arthritis, Asthma, Depression, GERD (gastroesophageal reflux disease), Obesity, MAURICE on CPAP, Thyroid disease, Under care of service provider, Under care of service provider, and Under care of service provider. Past Surgical History   has a past surgical history that includes Ankle surgery (); Total knee arthroplasty (Right, 2018); Total hip arthroplasty (Left, ); Total hip arthroplasty (Right, ); Esophagogastroduodenoscopy; Upper gastrointestinal endoscopy (N/A, 2022); Tubal ligation (); and Endometrial ablation (). Social History   reports that she quit smoking about 2 years ago. Her smoking use included cigarettes. She has a 35.00 pack-year smoking history. She has never used smokeless tobacco.    reports current alcohol use of about 1.0 standard drink per week. reports that she does not currently use drugs. Marital Status    Children 2  Occupation disabled   Family History  Family Status   Relation Name Status    Mother  Alive    Father       family history includes Epilepsy in her father; No Known Problems in her mother.     Review of Systems:  CONSTITUTIONAL:   negative for fevers, chills, fatigue and malaise    EYES:   negative for double vision, blurred vision and photophobia    HEENT:   negative for tinnitus, epistaxis and sore throat     RESPIRATORY:   negative for cough, shortness of breath, wheezing     CARDIOVASCULAR:   negative for chest pain, palpitations, syncope, edema     GASTROINTESTINAL:   negative for nausea, vomiting     GENITOURINARY:   negative for incontinence     MUSCULOSKELETAL:   negative for neck or back pain     NEUROLOGICAL:   Negative for weakness and tingling  negative for headaches and dizziness PSYCHIATRIC:   negative for anxiety       OBJECTIVE:   VITALS:  height is 5' 5\" (1.651 m) and weight is 264 lb (119.7 kg). Her temporal temperature is 97.2 °F (36.2 °C). Her blood pressure is 141/89 (abnormal) and her pulse is 93. Her respiration is 18 and oxygen saturation is 98%. CONSTITUTIONAL:alert & oriented x 3, no acute distress. Friendly. Very pleasant. SKIN:  Warm and dry, no rashes on exposed areas of skin   HEAD:  Normocephalic, atraumatic   EYES: EOMs intact. PERRL. EARS:  Equal bilaterally, no edema or thickening, skin is intact without lumps or lesions. No discharge. Hearing grossly WNL. NOSE:  Nares patent. No rhinorrhea   MOUTH/THROAT:  Mucous membranes dry, uvula midline, teeth appear to be intact  NECK:full ROM  LUNGS: Respirations even and non-labored. Clear to auscultation bilaterally, no wheezes, rales, or rhonchi. CARDIOVASCULAR: Regular rate and rhythm, no murmurs/rubs/gallops   ABDOMEN: soft, non-tender, non-distended, bowel sounds active x 4 , obese  EXTREMITIES: No edema bilateral lower extremities. No varicosities bilateral lower extremities. NEUROLOGIC: CN II-XII are grossly intact.  Gait is smooth, rhythmic and effortless   Testing:   EK22  Labs pending: drawn 2022   Chest XRay:  22  IMPRESSIONS:   Obesity, MAURICE, GERD  PLANS:   XI ROBOTIC LAPAROSCOPIC GASTRIC BYPASS ROLANDO-EN-Y EGD, LIVER BIOPSY     JANEE DANIEL CNP  Electronically signed 2022 at 11:46 AM

## 2022-11-28 NOTE — PROGRESS NOTES
Anesthesia Focused Assessment    Hx of anesthesia complications:  came out angry after knee surgery, no issues with surgeries since then   Family hx of anesthesia complications:  no      Prior + Covid-19 test? Yes, early in the pandemic   Covid vaccinated?: yes    MAURICE:                                             yes  If yes, machine?:                          cpap, instructed to bring DOS, but states machine is on back order    Type 1 DM:                                   no  T2DM:                                           no    Coronary Artery Disease:             no  Hypertension:                               no  Defib / AICD / Pacemaker:           no    Renal Failure:                               no  If yes, on dialysis? :                           Active smoker:                              no, quit in 2020  Drinks Alcohol:                              monthly  Illicit drugs:                                    hx CBD gummies    Dentition:                                      left lower crown intact     Past Medical History:   Diagnosis Date    Anxiety     Arthritis     Asthma     Depression     GERD (gastroesophageal reflux disease)     Obesity     MAURICE on CPAP     11/28/2022 machine on back order    Thyroid disease     Under care of service provider 11/28/2022    psych-Dr rodrigues-last visit 10/2022    Under care of service provider 11/28/2022    pcp-tennille esposito-last visit oct 2022    Under care of service provider 11/28/2022    endocrine-enrique-last visit oct 2022     Patient was evaluated in PAT & anesthesia guidelines were applied. NPO guidelines, medication instructions and scheduled arrival time were reviewed with patient. I advised patient/patient family to please contact surgeons office, ahead of time if possible, if any new signs or symptoms of illness, infection, rash, etc. Patient/ patient family verbalize understanding. Anesthesia contacted:   no    -Medical clearance pending, will request a copy be obtained, has appt with Ashley Suarez on Wed 11/30/22    -Pulmonary clearance in media.      Katie Presser, JANEE - CNP  Electronically signed 11/28/2022 at 11:46 AM'

## 2022-11-29 LAB
EKG ATRIAL RATE: 92 BPM
EKG P AXIS: 30 DEGREES
EKG P-R INTERVAL: 194 MS
EKG Q-T INTERVAL: 356 MS
EKG QRS DURATION: 84 MS
EKG QTC CALCULATION (BAZETT): 440 MS
EKG R AXIS: -15 DEGREES
EKG T AXIS: 20 DEGREES
EKG VENTRICULAR RATE: 92 BPM

## 2022-11-30 ENCOUNTER — TELEPHONE (OUTPATIENT)
Dept: BARIATRICS/WEIGHT MGMT | Age: 52
End: 2022-11-30

## 2022-11-30 LAB
COTININE: <5 NG/ML
NICOTINE: <5 NG/ML

## 2022-11-30 NOTE — TELEPHONE ENCOUNTER
Patient is scheduled to have a bypass on 12-13-22 and does not have her cpap machine yet. She has been in contact with the company.

## 2022-12-08 ENCOUNTER — OFFICE VISIT (OUTPATIENT)
Dept: BARIATRICS/WEIGHT MGMT | Age: 52
End: 2022-12-08
Payer: COMMERCIAL

## 2022-12-08 VITALS
DIASTOLIC BLOOD PRESSURE: 70 MMHG | SYSTOLIC BLOOD PRESSURE: 124 MMHG | HEIGHT: 66 IN | HEART RATE: 70 BPM | BODY MASS INDEX: 40.66 KG/M2 | WEIGHT: 253 LBS

## 2022-12-08 DIAGNOSIS — K21.9 GERD WITHOUT ESOPHAGITIS: Primary | ICD-10-CM

## 2022-12-08 DIAGNOSIS — E66.01 MORBID OBESITY WITH BMI OF 40.0-44.9, ADULT (HCC): ICD-10-CM

## 2022-12-08 DIAGNOSIS — G47.33 OSA (OBSTRUCTIVE SLEEP APNEA): ICD-10-CM

## 2022-12-08 PROCEDURE — 99214 OFFICE O/P EST MOD 30 MIN: CPT | Performed by: SURGERY

## 2022-12-08 NOTE — PROGRESS NOTES
[address] [facility] INVASIVE BARIATRIC SURG  [address]  [address]  [facility] [address]  Dept: [phone]    SURGICAL WEIGHT MANAGEMENT PROGRAM   PROGRESS NOTE - SURGICAL EVALUATION    CC: Weight Loss       Patient: Miya Lyons        Service Date: 12/8/2022       Medical Record #: [id number]    Patient History/Assessment Summary:    The patient is a pleasant 47y.o. year old female  with morbid obesity, who stands Height: 5' 5.5\" (166.4 cm) tall with a weight of Weight: 253 lb (114.8 kg) , resulting in a BMI of Body mass index is 41.46 kg/m². .     This patient is alone for the evaluation today. The patient denies  a history of myocardial infarction, deep vein thrombosis, pulmonary embolism, renal failure, hepatic failure, and stroke. The patient is being evaluated to undergo weight loss surgery to treat the following comorbid conditions caused by her morbid obesity: Obstructive Sleep Apnea, Hypothyroidism, GERD, and Depression    She attended the weight loss surgery seminar, and attended bariatric education    Last Visit Weight:   Wt Readings from Last 3 Encounters:   12/08/22 253 lb (114.8 kg)   11/28/22 264 lb (119.7 kg)   09/15/22 240 lb (108.9 kg)     Today's weight is increased from the last visit. Highest Weight: 264 lbs    The patient is being evaluated for Laparoscopic Gordy-en-Y Gastric Bypass. She  is here today to review the details of surgery. The patient acknowledges and understands the risks, benefits, and options we have discussed, as outlined in the Additional Informed Consent for this procedure. Patient also understands the importance of surgical and post-operative recommendations, including the operative diet and regular post-operative follow up care. The importance of ambulation and incentive spirometry was also discussed. All questions of this patient and any family members present have been answered to their satisfaction.     Physical Examination:     /70 (Site: Left Upper Arm, Position: Sitting, Cuff Size: Large Adult)   Pulse 70   Ht 5' 5.5\" (1.664 m)   Wt 253 lb (114.8 kg)   BMI 41.46 kg/m²   General This patient is awake, alert, and oriented, and is in no apparent distress. Cardiac Regular rate and rhythm without evidence of murmur. Respiratory Clear to auscultation bilaterally. Abdomen Obese, soft, non-tender, non-distended without masses/ No  evidence of abdominal hernia / Incisions consistent with previous surgeries. Head and Neck Obese, normocephalic and atraumatic/soft and supple, no  lymphadenopathy or obvious bruits. Extremeties No cyanosis, clubbing or edema/ No calf tenderness/No  restrictions of movement, is ambulatory without assistance. Neurological Intact x 4 extremities, no focal deficits noted   Skin No rashes or lesions noted   Rectal Deferred     RECOMMENDATIONS:       Diagnosis Orders   1. GERD without esophagitis        2. MAURICE (obstructive sleep apnea)        3. Morbid obesity with BMI of 40.0-44.9, adult ([facility] [address]               We spent a great deal of time discussing the risks and benefits of Laparoscopic Gordy-en-Y Gastric Bypass, including but not limited to injury to intra-abdominal organs, breakdown of the gastric staple line, the need for re-operative therapy,  prolonged hospitalization,  mechanical ventilation,  and death. We discussed the possibility of bleeding, the need for blood transfusions, blood clots, hospital-acquired and intra-abdominal infection, anastomotic stricture, and worsening GERD. And we discussed the need for post-operative visit compliance, behavior modifications and diet changes, protein and vitamin supplementation, as well as routine scheduled and dedicated exercise.   We discussed the potential weight loss benefit of approximately 60-70% of her excess body weight at 12-18 months post-op, as well as the possibility of insufficient weight loss or weight gain after 2 years post-operative time. The following was discussed with the patient:    DVT Prophylaxis    Recommended to discuss medications with her psychiatrist    GERD  Risks of GERD discussed  Likely resolution of GERD discussed  Potential hiatal hernia repair discussed     Morbid Obesity  Surgical and medical options for weight loss given  Need for long term diet and exercise discussed to sustain weight loss  Decision for surgery    Scribe Attestation:  Martine Billy, scribed on behalf of Clotilde Schaefer DO. Electronically signed by Clotilde Schaefer DO on 12/8/2022 at 12:57 PM    Please note that this chart was generated using voice recognition Dragon dictation software. Although every effort was made to ensure the accuracy of this automated transcription, some errors in transcription may have occurred. Kandyce Leyden, DO DO, personally performed the services described in this documentation. All medical record entries made by the scribe were at my direction and in my presence. I have reviewed the chart and discharge instructions (if applicable) and agree that the record reflects my personal performance and is accurate and complete.     Electronically Signed: Clotilde Schaefer DO. 12/16/22. 1:01 PM.

## 2022-12-13 ENCOUNTER — HOSPITAL ENCOUNTER (OUTPATIENT)
Age: 52
Setting detail: OBSERVATION
Discharge: HOME | End: 2022-12-14
Attending: SURGERY | Admitting: SURGERY
Payer: COMMERCIAL

## 2022-12-13 ENCOUNTER — ANESTHESIA EVENT (OUTPATIENT)
Dept: OPERATING ROOM | Age: 52
End: 2022-12-13
Payer: COMMERCIAL

## 2022-12-13 ENCOUNTER — ANESTHESIA (OUTPATIENT)
Dept: OPERATING ROOM | Age: 52
End: 2022-12-13
Payer: COMMERCIAL

## 2022-12-13 DIAGNOSIS — Z98.84 STATUS POST GASTRIC BYPASS FOR OBESITY: Primary | ICD-10-CM

## 2022-12-13 LAB
ANION GAP SERPL CALCULATED.3IONS-SCNC: 16 MMOL/L (ref 9–17)
BUN BLDV-MCNC: 8 MG/DL (ref 6–20)
CALCIUM SERPL-MCNC: 9.1 MG/DL (ref 8.6–10.4)
CHLORIDE BLD-SCNC: 105 MMOL/L (ref 98–107)
CO2: 17 MMOL/L (ref 20–31)
CREAT SERPL-MCNC: 0.76 MG/DL (ref 0.5–0.9)
GFR SERPL CREATININE-BSD FRML MDRD: >60 ML/MIN/1.73M2
GLUCOSE BLD-MCNC: 161 MG/DL (ref 70–99)
HCT VFR BLD CALC: 45.4 % (ref 36.3–47.1)
HEMOGLOBIN: 14.6 G/DL (ref 11.9–15.1)
MCH RBC QN AUTO: 30.7 PG (ref 25.2–33.5)
MCHC RBC AUTO-ENTMCNC: 32.2 G/DL (ref 28.4–34.8)
MCV RBC AUTO: 95.4 FL (ref 82.6–102.9)
NRBC AUTOMATED: 0 PER 100 WBC
PDW BLD-RTO: 13.2 % (ref 11.8–14.4)
PLATELET # BLD: 346 K/UL (ref 138–453)
PMV BLD AUTO: 8.5 FL (ref 8.1–13.5)
POTASSIUM SERPL-SCNC: 4.9 MMOL/L (ref 3.7–5.3)
RBC # BLD: 4.76 M/UL (ref 3.95–5.11)
SODIUM BLD-SCNC: 138 MMOL/L (ref 135–144)
WBC # BLD: 16.1 K/UL (ref 3.5–11.3)

## 2022-12-13 PROCEDURE — 2580000003 HC RX 258: Performed by: SURGERY

## 2022-12-13 PROCEDURE — 85027 COMPLETE CBC AUTOMATED: CPT

## 2022-12-13 PROCEDURE — 6370000000 HC RX 637 (ALT 250 FOR IP): Performed by: SURGERY

## 2022-12-13 PROCEDURE — 2500000003 HC RX 250 WO HCPCS: Performed by: SURGERY

## 2022-12-13 PROCEDURE — 7100000000 HC PACU RECOVERY - FIRST 15 MIN: Performed by: SURGERY

## 2022-12-13 PROCEDURE — 2580000003 HC RX 258: Performed by: ANESTHESIOLOGY

## 2022-12-13 PROCEDURE — 80048 BASIC METABOLIC PNL TOTAL CA: CPT

## 2022-12-13 PROCEDURE — 7100000001 HC PACU RECOVERY - ADDTL 15 MIN: Performed by: SURGERY

## 2022-12-13 PROCEDURE — 2720000010 HC SURG SUPPLY STERILE: Performed by: SURGERY

## 2022-12-13 PROCEDURE — 6360000002 HC RX W HCPCS

## 2022-12-13 PROCEDURE — 2500000003 HC RX 250 WO HCPCS: Performed by: NURSE ANESTHETIST, CERTIFIED REGISTERED

## 2022-12-13 PROCEDURE — 81025 URINE PREGNANCY TEST: CPT

## 2022-12-13 PROCEDURE — 6360000002 HC RX W HCPCS: Performed by: ANESTHESIOLOGY

## 2022-12-13 PROCEDURE — S2900 ROBOTIC SURGICAL SYSTEM: HCPCS | Performed by: SURGERY

## 2022-12-13 PROCEDURE — 6360000002 HC RX W HCPCS: Performed by: STUDENT IN AN ORGANIZED HEALTH CARE EDUCATION/TRAINING PROGRAM

## 2022-12-13 PROCEDURE — 6360000002 HC RX W HCPCS: Performed by: NURSE ANESTHETIST, CERTIFIED REGISTERED

## 2022-12-13 PROCEDURE — G0378 HOSPITAL OBSERVATION PER HR: HCPCS

## 2022-12-13 PROCEDURE — 96372 THER/PROPH/DIAG INJ SC/IM: CPT

## 2022-12-13 PROCEDURE — 2709999900 HC NON-CHARGEABLE SUPPLY: Performed by: SURGERY

## 2022-12-13 PROCEDURE — 6360000002 HC RX W HCPCS: Performed by: SURGERY

## 2022-12-13 PROCEDURE — 3700000001 HC ADD 15 MINUTES (ANESTHESIA): Performed by: SURGERY

## 2022-12-13 PROCEDURE — 3600000009 HC SURGERY ROBOT BASE: Performed by: SURGERY

## 2022-12-13 PROCEDURE — 3700000000 HC ANESTHESIA ATTENDED CARE: Performed by: SURGERY

## 2022-12-13 PROCEDURE — 3600000019 HC SURGERY ROBOT ADDTL 15MIN: Performed by: SURGERY

## 2022-12-13 RX ORDER — FENTANYL CITRATE 50 UG/ML
25 INJECTION, SOLUTION INTRAMUSCULAR; INTRAVENOUS EVERY 5 MIN PRN
Status: DISCONTINUED | OUTPATIENT
Start: 2022-12-13 | End: 2022-12-13 | Stop reason: HOSPADM

## 2022-12-13 RX ORDER — SCOLOPAMINE TRANSDERMAL SYSTEM 1 MG/1
1 PATCH, EXTENDED RELEASE TRANSDERMAL
Status: DISCONTINUED | OUTPATIENT
Start: 2022-12-13 | End: 2022-12-14 | Stop reason: HOSPADM

## 2022-12-13 RX ORDER — MIDAZOLAM HYDROCHLORIDE 1 MG/ML
INJECTION INTRAMUSCULAR; INTRAVENOUS PRN
Status: DISCONTINUED | OUTPATIENT
Start: 2022-12-13 | End: 2022-12-13 | Stop reason: SDUPTHER

## 2022-12-13 RX ORDER — LITHIUM CARBONATE 300 MG/1
300 CAPSULE ORAL DAILY
COMMUNITY

## 2022-12-13 RX ORDER — LIDOCAINE HYDROCHLORIDE 10 MG/ML
INJECTION, SOLUTION EPIDURAL; INFILTRATION; INTRACAUDAL; PERINEURAL PRN
Status: DISCONTINUED | OUTPATIENT
Start: 2022-12-13 | End: 2022-12-13 | Stop reason: SDUPTHER

## 2022-12-13 RX ORDER — SODIUM CHLORIDE, SODIUM LACTATE, POTASSIUM CHLORIDE, CALCIUM CHLORIDE 600; 310; 30; 20 MG/100ML; MG/100ML; MG/100ML; MG/100ML
INJECTION, SOLUTION INTRAVENOUS CONTINUOUS
Status: DISCONTINUED | OUTPATIENT
Start: 2022-12-13 | End: 2022-12-14 | Stop reason: HOSPADM

## 2022-12-13 RX ORDER — SODIUM CHLORIDE 0.9 % (FLUSH) 0.9 %
5-40 SYRINGE (ML) INJECTION PRN
Status: DISCONTINUED | OUTPATIENT
Start: 2022-12-13 | End: 2022-12-13 | Stop reason: HOSPADM

## 2022-12-13 RX ORDER — SODIUM CHLORIDE 9 MG/ML
INJECTION, SOLUTION INTRAVENOUS PRN
Status: DISCONTINUED | OUTPATIENT
Start: 2022-12-13 | End: 2022-12-13 | Stop reason: HOSPADM

## 2022-12-13 RX ORDER — METRONIDAZOLE 500 MG/100ML
500 INJECTION, SOLUTION INTRAVENOUS EVERY 8 HOURS
Status: COMPLETED | OUTPATIENT
Start: 2022-12-13 | End: 2022-12-14

## 2022-12-13 RX ORDER — METRONIDAZOLE 500 MG/100ML
500 INJECTION, SOLUTION INTRAVENOUS ONCE
Status: COMPLETED | OUTPATIENT
Start: 2022-12-13 | End: 2022-12-13

## 2022-12-13 RX ORDER — HEPARIN SODIUM 5000 [USP'U]/ML
5000 INJECTION, SOLUTION INTRAVENOUS; SUBCUTANEOUS EVERY 8 HOURS SCHEDULED
Status: DISCONTINUED | OUTPATIENT
Start: 2022-12-13 | End: 2022-12-14 | Stop reason: HOSPADM

## 2022-12-13 RX ORDER — FENTANYL CITRATE 50 UG/ML
50 INJECTION, SOLUTION INTRAMUSCULAR; INTRAVENOUS EVERY 5 MIN PRN
Status: COMPLETED | OUTPATIENT
Start: 2022-12-13 | End: 2022-12-13

## 2022-12-13 RX ORDER — OXYCODONE HYDROCHLORIDE AND ACETAMINOPHEN 5; 325 MG/1; MG/1
2 TABLET ORAL EVERY 6 HOURS PRN
Status: DISCONTINUED | OUTPATIENT
Start: 2022-12-13 | End: 2022-12-14 | Stop reason: HOSPADM

## 2022-12-13 RX ORDER — PROMETHAZINE HYDROCHLORIDE 25 MG/1
25 TABLET ORAL EVERY 6 HOURS PRN
Status: DISCONTINUED | OUTPATIENT
Start: 2022-12-13 | End: 2022-12-14 | Stop reason: HOSPADM

## 2022-12-13 RX ORDER — ONDANSETRON 2 MG/ML
4 INJECTION INTRAMUSCULAR; INTRAVENOUS EVERY 6 HOURS PRN
Status: DISCONTINUED | OUTPATIENT
Start: 2022-12-13 | End: 2022-12-14 | Stop reason: HOSPADM

## 2022-12-13 RX ORDER — LITHIUM CARBONATE 300 MG/1
600 CAPSULE ORAL NIGHTLY
COMMUNITY

## 2022-12-13 RX ORDER — GLYCOPYRROLATE 0.2 MG/ML
INJECTION INTRAMUSCULAR; INTRAVENOUS PRN
Status: DISCONTINUED | OUTPATIENT
Start: 2022-12-13 | End: 2022-12-13 | Stop reason: SDUPTHER

## 2022-12-13 RX ORDER — SODIUM CHLORIDE 9 MG/ML
INJECTION, SOLUTION INTRAVENOUS PRN
Status: DISCONTINUED | OUTPATIENT
Start: 2022-12-13 | End: 2022-12-14 | Stop reason: HOSPADM

## 2022-12-13 RX ORDER — LITHIUM CARBONATE 300 MG
600 TABLET ORAL NIGHTLY
Status: DISCONTINUED | OUTPATIENT
Start: 2022-12-13 | End: 2022-12-14 | Stop reason: HOSPADM

## 2022-12-13 RX ORDER — PROPOFOL 10 MG/ML
INJECTION, EMULSION INTRAVENOUS PRN
Status: DISCONTINUED | OUTPATIENT
Start: 2022-12-13 | End: 2022-12-13 | Stop reason: SDUPTHER

## 2022-12-13 RX ORDER — HYDRALAZINE HYDROCHLORIDE 20 MG/ML
10 INJECTION INTRAMUSCULAR; INTRAVENOUS EVERY 5 MIN PRN
Status: DISCONTINUED | OUTPATIENT
Start: 2022-12-13 | End: 2022-12-13 | Stop reason: HOSPADM

## 2022-12-13 RX ORDER — SODIUM CHLORIDE, SODIUM LACTATE, POTASSIUM CHLORIDE, CALCIUM CHLORIDE 600; 310; 30; 20 MG/100ML; MG/100ML; MG/100ML; MG/100ML
1000 INJECTION, SOLUTION INTRAVENOUS CONTINUOUS
Status: DISCONTINUED | OUTPATIENT
Start: 2022-12-13 | End: 2022-12-13 | Stop reason: HOSPADM

## 2022-12-13 RX ORDER — BUPIVACAINE HYDROCHLORIDE 5 MG/ML
INJECTION, SOLUTION PERINEURAL PRN
Status: DISCONTINUED | OUTPATIENT
Start: 2022-12-13 | End: 2022-12-13 | Stop reason: HOSPADM

## 2022-12-13 RX ORDER — ONDANSETRON 2 MG/ML
INJECTION INTRAMUSCULAR; INTRAVENOUS PRN
Status: DISCONTINUED | OUTPATIENT
Start: 2022-12-13 | End: 2022-12-13 | Stop reason: SDUPTHER

## 2022-12-13 RX ORDER — OXYCODONE HYDROCHLORIDE AND ACETAMINOPHEN 5; 325 MG/1; MG/1
1 TABLET ORAL EVERY 6 HOURS PRN
Status: DISCONTINUED | OUTPATIENT
Start: 2022-12-13 | End: 2022-12-14 | Stop reason: HOSPADM

## 2022-12-13 RX ORDER — MORPHINE SULFATE 10 MG/ML
INJECTION, SOLUTION INTRAMUSCULAR; INTRAVENOUS PRN
Status: DISCONTINUED | OUTPATIENT
Start: 2022-12-13 | End: 2022-12-13 | Stop reason: SDUPTHER

## 2022-12-13 RX ORDER — DEXAMETHASONE SODIUM PHOSPHATE 10 MG/ML
INJECTION INTRAMUSCULAR; INTRAVENOUS PRN
Status: DISCONTINUED | OUTPATIENT
Start: 2022-12-13 | End: 2022-12-13 | Stop reason: SDUPTHER

## 2022-12-13 RX ORDER — CYCLOBENZAPRINE HCL 10 MG
10 TABLET ORAL 3 TIMES DAILY PRN
Status: DISCONTINUED | OUTPATIENT
Start: 2022-12-13 | End: 2022-12-14 | Stop reason: HOSPADM

## 2022-12-13 RX ORDER — SODIUM CHLORIDE 0.9 % (FLUSH) 0.9 %
5-40 SYRINGE (ML) INJECTION EVERY 12 HOURS SCHEDULED
Status: DISCONTINUED | OUTPATIENT
Start: 2022-12-13 | End: 2022-12-14 | Stop reason: HOSPADM

## 2022-12-13 RX ORDER — IPRATROPIUM BROMIDE AND ALBUTEROL SULFATE 2.5; .5 MG/3ML; MG/3ML
1 SOLUTION RESPIRATORY (INHALATION)
Status: DISCONTINUED | OUTPATIENT
Start: 2022-12-13 | End: 2022-12-14 | Stop reason: HOSPADM

## 2022-12-13 RX ORDER — HEPARIN SODIUM 5000 [USP'U]/ML
5000 INJECTION, SOLUTION INTRAVENOUS; SUBCUTANEOUS ONCE
Status: COMPLETED | OUTPATIENT
Start: 2022-12-13 | End: 2022-12-13

## 2022-12-13 RX ORDER — NEOSTIGMINE METHYLSULFATE 5 MG/5 ML
SYRINGE (ML) INTRAVENOUS PRN
Status: DISCONTINUED | OUTPATIENT
Start: 2022-12-13 | End: 2022-12-13 | Stop reason: SDUPTHER

## 2022-12-13 RX ORDER — ROCURONIUM BROMIDE 10 MG/ML
INJECTION, SOLUTION INTRAVENOUS PRN
Status: DISCONTINUED | OUTPATIENT
Start: 2022-12-13 | End: 2022-12-13 | Stop reason: SDUPTHER

## 2022-12-13 RX ORDER — FENTANYL CITRATE 50 UG/ML
INJECTION, SOLUTION INTRAMUSCULAR; INTRAVENOUS PRN
Status: DISCONTINUED | OUTPATIENT
Start: 2022-12-13 | End: 2022-12-13 | Stop reason: SDUPTHER

## 2022-12-13 RX ORDER — PANTOPRAZOLE SODIUM 40 MG/1
40 TABLET, DELAYED RELEASE ORAL DAILY
Status: DISCONTINUED | OUTPATIENT
Start: 2022-12-14 | End: 2022-12-14 | Stop reason: HOSPADM

## 2022-12-13 RX ORDER — LABETALOL HYDROCHLORIDE 5 MG/ML
INJECTION, SOLUTION INTRAVENOUS PRN
Status: DISCONTINUED | OUTPATIENT
Start: 2022-12-13 | End: 2022-12-13 | Stop reason: SDUPTHER

## 2022-12-13 RX ORDER — ONDANSETRON 2 MG/ML
INJECTION INTRAMUSCULAR; INTRAVENOUS PRN
Status: DISCONTINUED | OUTPATIENT
Start: 2022-12-13 | End: 2022-12-13

## 2022-12-13 RX ORDER — SODIUM CHLORIDE 0.9 % (FLUSH) 0.9 %
5-40 SYRINGE (ML) INJECTION PRN
Status: DISCONTINUED | OUTPATIENT
Start: 2022-12-13 | End: 2022-12-14 | Stop reason: HOSPADM

## 2022-12-13 RX ORDER — SODIUM CHLORIDE 0.9 % (FLUSH) 0.9 %
5-40 SYRINGE (ML) INJECTION EVERY 12 HOURS SCHEDULED
Status: DISCONTINUED | OUTPATIENT
Start: 2022-12-13 | End: 2022-12-13 | Stop reason: HOSPADM

## 2022-12-13 RX ORDER — LITHIUM CARBONATE 300 MG
300 TABLET ORAL DAILY
Status: DISCONTINUED | OUTPATIENT
Start: 2022-12-14 | End: 2022-12-14 | Stop reason: HOSPADM

## 2022-12-13 RX ORDER — MAGNESIUM HYDROXIDE 1200 MG/15ML
LIQUID ORAL CONTINUOUS PRN
Status: DISCONTINUED | OUTPATIENT
Start: 2022-12-13 | End: 2022-12-13 | Stop reason: HOSPADM

## 2022-12-13 RX ORDER — ONDANSETRON 2 MG/ML
4 INJECTION INTRAMUSCULAR; INTRAVENOUS
Status: COMPLETED | OUTPATIENT
Start: 2022-12-13 | End: 2022-12-13

## 2022-12-13 RX ADMIN — MIDAZOLAM 2 MG: 1 INJECTION INTRAMUSCULAR; INTRAVENOUS at 13:11

## 2022-12-13 RX ADMIN — FENTANYL CITRATE 50 MCG: 50 INJECTION, SOLUTION INTRAMUSCULAR; INTRAVENOUS at 16:40

## 2022-12-13 RX ADMIN — OXYCODONE HYDROCHLORIDE AND ACETAMINOPHEN 2 TABLET: 5; 325 TABLET ORAL at 16:51

## 2022-12-13 RX ADMIN — HEPARIN SODIUM 5000 UNITS: 5000 INJECTION INTRAVENOUS; SUBCUTANEOUS at 12:40

## 2022-12-13 RX ADMIN — LIDOCAINE HYDROCHLORIDE 50 MG: 10 INJECTION, SOLUTION EPIDURAL; INFILTRATION; INTRACAUDAL; PERINEURAL at 13:18

## 2022-12-13 RX ADMIN — METRONIDAZOLE 500 MG: 500 SOLUTION INTRAVENOUS at 13:22

## 2022-12-13 RX ADMIN — OXYCODONE HYDROCHLORIDE AND ACETAMINOPHEN 2 TABLET: 5; 325 TABLET ORAL at 22:09

## 2022-12-13 RX ADMIN — GLYCOPYRROLATE 0.2 MG: 0.2 INJECTION INTRAMUSCULAR; INTRAVENOUS at 15:50

## 2022-12-13 RX ADMIN — Medication 10 MG: at 14:48

## 2022-12-13 RX ADMIN — SODIUM CHLORIDE, POTASSIUM CHLORIDE, SODIUM LACTATE AND CALCIUM CHLORIDE: 600; 310; 30; 20 INJECTION, SOLUTION INTRAVENOUS at 22:12

## 2022-12-13 RX ADMIN — MIDAZOLAM 1 MG: 1 INJECTION INTRAMUSCULAR; INTRAVENOUS at 15:50

## 2022-12-13 RX ADMIN — HYDROMORPHONE HYDROCHLORIDE 0.5 MG: 1 INJECTION, SOLUTION INTRAMUSCULAR; INTRAVENOUS; SUBCUTANEOUS at 17:51

## 2022-12-13 RX ADMIN — MORPHINE SULFATE 5 MG: 10 INJECTION, SOLUTION INTRAMUSCULAR; INTRAVENOUS at 15:49

## 2022-12-13 RX ADMIN — Medication 2000 MG: at 13:28

## 2022-12-13 RX ADMIN — PROMETHAZINE HYDROCHLORIDE 25 MG: 12.5 TABLET ORAL at 16:51

## 2022-12-13 RX ADMIN — Medication 2 MG: at 15:47

## 2022-12-13 RX ADMIN — FENTANYL CITRATE 100 MCG: 0.05 INJECTION, SOLUTION INTRAMUSCULAR; INTRAVENOUS at 13:14

## 2022-12-13 RX ADMIN — SODIUM CHLORIDE, POTASSIUM CHLORIDE, SODIUM LACTATE AND CALCIUM CHLORIDE 1000 ML: 600; 310; 30; 20 INJECTION, SOLUTION INTRAVENOUS at 12:39

## 2022-12-13 RX ADMIN — ROCURONIUM BROMIDE 10 MG: 10 INJECTION INTRAVENOUS at 14:43

## 2022-12-13 RX ADMIN — HYDROMORPHONE HYDROCHLORIDE 0.5 MG: 1 INJECTION, SOLUTION INTRAMUSCULAR; INTRAVENOUS; SUBCUTANEOUS at 19:04

## 2022-12-13 RX ADMIN — FENTANYL CITRATE 50 MCG: 50 INJECTION, SOLUTION INTRAMUSCULAR; INTRAVENOUS at 16:31

## 2022-12-13 RX ADMIN — FENTANYL CITRATE 100 MCG: 0.05 INJECTION, SOLUTION INTRAMUSCULAR; INTRAVENOUS at 14:25

## 2022-12-13 RX ADMIN — MORPHINE SULFATE 5 MG: 10 INJECTION, SOLUTION INTRAMUSCULAR; INTRAVENOUS at 15:45

## 2022-12-13 RX ADMIN — FENTANYL CITRATE 50 MCG: 0.05 INJECTION, SOLUTION INTRAMUSCULAR; INTRAVENOUS at 13:18

## 2022-12-13 RX ADMIN — GLYCOPYRROLATE 0.4 MG: 0.2 INJECTION INTRAMUSCULAR; INTRAVENOUS at 15:47

## 2022-12-13 RX ADMIN — ROCURONIUM BROMIDE 30 MG: 10 INJECTION INTRAVENOUS at 13:47

## 2022-12-13 RX ADMIN — FENTANYL CITRATE 100 MCG: 0.05 INJECTION, SOLUTION INTRAMUSCULAR; INTRAVENOUS at 14:12

## 2022-12-13 RX ADMIN — ONDANSETRON 4 MG: 2 INJECTION INTRAMUSCULAR; INTRAVENOUS at 15:40

## 2022-12-13 RX ADMIN — HYDRALAZINE HYDROCHLORIDE 10 MG: 20 INJECTION INTRAMUSCULAR; INTRAVENOUS at 17:05

## 2022-12-13 RX ADMIN — CYCLOBENZAPRINE 10 MG: 10 TABLET, FILM COATED ORAL at 16:51

## 2022-12-13 RX ADMIN — DEXAMETHASONE SODIUM PHOSPHATE 10 MG: 10 INJECTION INTRAMUSCULAR; INTRAVENOUS at 13:45

## 2022-12-13 RX ADMIN — SODIUM CHLORIDE, POTASSIUM CHLORIDE, SODIUM LACTATE AND CALCIUM CHLORIDE: 600; 310; 30; 20 INJECTION, SOLUTION INTRAVENOUS at 14:25

## 2022-12-13 RX ADMIN — PROPOFOL 200 MG: 10 INJECTION, EMULSION INTRAVENOUS at 13:18

## 2022-12-13 RX ADMIN — ROCURONIUM BROMIDE 50 MG: 10 INJECTION INTRAVENOUS at 13:18

## 2022-12-13 RX ADMIN — ONDANSETRON 4 MG: 2 INJECTION INTRAMUSCULAR; INTRAVENOUS at 16:31

## 2022-12-13 ASSESSMENT — PAIN DESCRIPTION - LOCATION: LOCATION: ABDOMEN

## 2022-12-13 ASSESSMENT — PAIN DESCRIPTION - PAIN TYPE: TYPE: SURGICAL PAIN

## 2022-12-13 ASSESSMENT — PAIN DESCRIPTION - ORIENTATION: ORIENTATION: LEFT

## 2022-12-13 ASSESSMENT — PAIN SCALES - GENERAL
PAINLEVEL_OUTOF10: 7
PAINLEVEL_OUTOF10: 9
PAINLEVEL_OUTOF10: 9
PAINLEVEL_OUTOF10: 7

## 2022-12-13 ASSESSMENT — PAIN DESCRIPTION - DESCRIPTORS: DESCRIPTORS: SHARP

## 2022-12-13 ASSESSMENT — LIFESTYLE VARIABLES: SMOKING_STATUS: 1

## 2022-12-13 ASSESSMENT — PAIN DESCRIPTION - ONSET: ONSET: ON-GOING

## 2022-12-13 ASSESSMENT — PAIN - FUNCTIONAL ASSESSMENT
PAIN_FUNCTIONAL_ASSESSMENT: ACTIVITIES ARE NOT PREVENTED
PAIN_FUNCTIONAL_ASSESSMENT: 0-10

## 2022-12-13 ASSESSMENT — PAIN DESCRIPTION - FREQUENCY: FREQUENCY: CONTINUOUS

## 2022-12-13 NOTE — BRIEF OP NOTE
Brief Postoperative Note      Patient: Cristian Mendenhall  YOB: 1970  MRN: [id number]    Date of Procedure: 12/13/2022    Pre-Op Diagnosis: Morbid obesity ([address] [E66.01]  Obstructive sleep apnea (adult) (pediatric) [G47.33]  Gastroesophageal reflux disease with esophagitis, unspecified whether hemorrhage [K21.00]    Post-Op Diagnosis: Same       Procedure(s):  XI ROBOTIC LAPAROSCOPIC GASTRIC BYPASS ROLANDO-EN-Y, EGD- GI SCHEDULED    Surgeon(s):  Ann Warren DO    Assistant:  First Assistant: Claudeen Kea  Resident: Clyde Dupont DO    Anesthesia: General    Estimated Blood Loss (mL): Minimal    Complications: None    Specimens:   * No specimens in log *    Implants:  * No implants in log *      Drains:   Open Drain 12/13/22 LLQ (Active)   Dressing Status New dressing applied 12/13/22 7862       Findings:   BMI 41  Counts reported to me as correct  Negative leak test  Limbs ran at conclusion of case confirming orientation  Hemostatic staple lines       Preoperative medications: Heparin, SCD's, Ancef/Flagyl     Indications for procedure: The patient is a pleasant 47 y.o. female with morbid obesity. They have a BMI of Body mass index is 41 kg/m². They've completed medical risk stratification and are scheduled for a Rolando-en-Y gastric bypass. Consent: The patient was seen and evaluated in the office setting where the procedure was explained to the patient and all questions were answered. Discussion was held between Dr. Nitin Hutchins and the patient. Viable alternatives to the proposed procedure including but not limited to medical observation under the care of a physician exercise programs and other weight reductive operative procedures were explained to the patient.   Risks of the proposed procedure including but not limited to hemorrhage requiring transfusion, infection, nerve injury, conversion to open procedure, need for further surgery, anastomotic disruption or leak, anastomotic stricture, pneumonia, pulmonary embolus, airway complications and death were explained to the patient. The patient understands the above alternatives and risks and has electively chosen laparoscopic Gordy-en-Y gastric bypass and wishes to proceed with the procedure. Description of procedure: The patient was taken to the operating room and placed in supine position. After successful induction of general endotracheal anesthesia by the anesthesia department a Marsh catheter and orogastric tube was placed to decompress the bladder and stomach respectively. The patient was placed in split leg lithotomy position and care was taken to pad the exposed extremities. Then was prepped and draped in normal sterile fashion. A 12 mm Optiview trocar was placed in the left sub-costal position in the midclavicular line and access to the abdominal cavity was obtained under visualization. Pneumoperitoneum was then established without apparent complications. Underlying structures surveyed, no evidence of injury. After evaluation of the abdominal contents from this trocar position 5 other ports were placed under direct visualization. One at the umbilicus, one 2-3 cm above the umbilicus, one at the subxiphoid area, one in the right subcostal margin in the midclavicular line, and the last one further lateral to the original Optiview port. A solution of 0.5% Marcaine was used to infiltrate the subcutaneous tissues prior to trocar placement. The robot docked. There was no apparent hiatal hernia. After decompression of the stomach with the orogastric tube, the orogastric tube and any esophageal probes were removed from the patient. The stomach was grasped using a forceps and retracted caudally to expose the phrenoesophageal ligament. This was taken down with hook cautery. A blunt palpation probe was used to expose the esophagus and left bundle of the right jacey.   At this point we released our retraction on the stomach and measured for 5-7 cm from the angle of His along the lesser curvature. We made a window along the lesser curvature through the pars flaccida with the Vessel Sealer and began the dissection. This allowed entry into the lesser sac. Once the lesser sac was entered a 60 mm x 2.5 mm NANDO stapler was used to come across the neurovascular bundle. A 60 mm x 3.5 mm stapler then passed across the stomach and fired. 60 mm × 3.5 mm staple loads were used to progress cephalad toward the angle of His. A 30 mL gastric pouch was created. After this was done the Orvil anvil for the EEA stapler was placed transorally. This anvil was attached to an 25 [place] Rosa orogastric tube and was placed by anesthesia without complications. The orogastric tube was seen in the gastric pouch. The scissors was used to make a gastrotomy in the gastric pouch and the orogastric tube was pulled out of the gastric pouch. At this point the orogastric tube was detached from the anvil and removed, leaving the anvil in the gastric pouch. Having successfully completed our small gastric pouch and anvil placement, we turned our attention to dividing the greater omentum. This was accomplished with the Vessel Sealer. After this we identified the ligament of Treitz and the inferior mesenteric vein, and went 50 cm distal.  A 3-0 silk suture and clips were then placed to marely the proximal bowel and then we divided the bowel using a 60 mm × 2.5 mm NANDO stapler. The mesentery was also transected using a Vessel Sealer. After this was done we brought the efferent limb all the way up to the gastric pouch in between our previously created defect in the greater omentum. The staple line and the jejunum was opened using cautery and after enlarging the lateral trocar site the 25 mm EEA stapler was introduced into the abdomen and into the open jejunum. Using the EEA stapler we performed our gastrojejunostomy.   The stapler was removed and 2 complete donuts were identified. The open ended jejunum was closed using a 60 mm x 2.5 mm NANDO stapler and bowel continuity reestablished. This amputated piece of jejunum was placed in an Endopouch and removed from the abdomen. We then measured distal on the small bowel and oriented our bowel 120 cm to create the jejunojejunostomy. The mesentery exteremely short  and hard to manipulate throughout the case. An enterotomy was created in each limb with the electrocautery  and using the triple stapling technique created a side-to-side jejunojejunostomy. 60 mm x 2.5 mm staple loads were utilized with 45 mm of the staple load. The opening was evaluated for hemostasis and care was taken to make sure the posterior wall of the anastomosis was free. This was directly visualized under laparoscopic visualization. The remaining free defect was closed with one or more firings of a 60 mm x 2.5 mm linear stapler. Numerous sutures of 3-0 Ethibond sutures were used to close the defect of the mesentery at the jejunojejunostomy. The piece of small bowel from the triple staple technique was then withdrawn from the abdomen. The staple line was noted to be intact and to have captured serosa the entire length of the anastomosis. The anastomosis appeared patent. Hemostasis obtained. Having restored bowel continuity we placed multiple interrupted sutures to reinforce the gastrojejunal anastomosis. Interrupted 3-0 Vicryl sutures were placed laterally, posterior laterally and anteriorly so that we could circumferentially reinforce the anastomosis. The bowel was pink and viable, and there was no tension on the anastomosis. Having completed our reinforced sutures we then proceeded with checking the anastomosis for leakage. After completing the gastrojejunostomy, upper GI endoscopy was performed in order to evaluate the integrity of the anastomosis.   The Olympus gastroscope was introduced through the mouth, through the esophagus and into the small gastric pouch. The anastomosis was noted to be patent widely. The lateral gastric staple line and gastrojejunal anastomosis were hemostatic. The scope passed through the anastomosis into the jejunum into both limbs without obstruction. The anastomosis was submerged under irrigation placed in the abdomen. There was no evidence of air extravasation on the intraoperative operative leak test performed. The air was evacuated and the scope removed from the patient. Penrose placed in left lateral incision. No specimens. The abdomen was then copiously irrigated and checked for hemostasis. The effluent was evacuated from the abdomen and then we then turned our attention to closure of the trocar sites. At this point prior to evacuation of the pneumoperitoneum we closed all our 12 mm ports with laparoscopic suture fascial closure device using 0 Vicryl. The pneumoperitoneum was then evacuated. The wounds were irrigated and found to be hemostatic. The skin was closed using 4-0 Vicryl in a running subcuticular fashion. Steri-Strips were applied to the wounds the patient was awakened from anesthesia extubated and taken to recovery in stable condition. The patient and her family updated to all operative findings.       Electronically signed by Rebeca Vivas DO on 12/13/2022 at 4:33 PM

## 2022-12-13 NOTE — INTERVAL H&P NOTE
Pt Name: Timothy Ferrer  MRN: [id number]  [place]: 1970  Date of evaluation: 12/13/2022    I have reviewed the patient's history and physical examination completed in pre-admission testing on 11/28/22    No changes to history or on examination today, unless noted below. Medical clearance on file. PCP office note in care everywhere. Pt also reports history of recent \"head cold\". She states she is on day 8 of this, states treated conservatively and is mostly resolved, reports some sinus pressure, denies fevers. Lungs CTA today heart RRR. Denies any other changes.      Yvette Watson, JANEE - CNP  12/13/22  12:33 PM

## 2022-12-13 NOTE — OP NOTE
Patient: Lawrence Davila  YOB: 1970  MRN: [id number]     Date of Procedure: 12/13/2022     Pre-Op Diagnosis: Morbid obesity ([address] [E66.01]  Obstructive sleep apnea (adult) (pediatric) [G47.33]  Gastroesophageal reflux disease with esophagitis, unspecified whether hemorrhage [K21.00]     Post-Op Diagnosis: Same       Procedure(s):  XI ROBOTIC LAPAROSCOPIC GASTRIC BYPASS ROLANDO-EN-Y, EGD- GI SCHEDULED     Surgeon(s):  Jorge Barillas DO     Assistant:  First Assistant: Xi Gamino  Resident: Tricia Beatty DO     Anesthesia: General     Estimated Blood Loss (mL): Minimal     Complications: None     Specimens:   * No specimens in log *     Implants:  * No implants in log *      Drains:        Open Drain 12/13/22 LLQ (Active)   Dressing Status New dressing applied 12/13/22 6529         Findings:   BMI 41  Counts reported to me as correct  Negative leak test  Limbs ran at conclusion of case confirming orientation  Hemostatic staple lines        Preoperative medications: Heparin, SCD's, Ancef/Flagyl     Indications for procedure: The patient is a pleasant 47 y.o. female with morbid obesity. They have a BMI of Body mass index is 41 kg/m². They've completed medical risk stratification and are scheduled for a [place]-en-Y gastric bypass. Consent: The patient was seen and evaluated in the office setting where the procedure was explained to the patient and all questions were answered. Discussion was held between Dr. Rl Park and the patient. Viable alternatives to the proposed procedure including but not limited to medical observation under the care of a physician exercise programs and other weight reductive operative procedures were explained to the patient.   Risks of the proposed procedure including but not limited to hemorrhage requiring transfusion, infection, nerve injury, conversion to open procedure, need for further surgery, anastomotic disruption or leak, anastomotic stricture, pneumonia, pulmonary embolus, airway complications and death were explained to the patient. The patient understands the above alternatives and risks and has electively chosen laparoscopic Gordy-en-Y gastric bypass and wishes to proceed with the procedure. Description of procedure: The patient was taken to the operating room and placed in supine position. After successful induction of general endotracheal anesthesia by the anesthesia department a Marsh catheter and orogastric tube was placed to decompress the bladder and stomach respectively. The patient was placed in split leg lithotomy position and care was taken to pad the exposed extremities. Then was prepped and draped in normal sterile fashion. A 12 mm Optiview trocar was placed in the left sub-costal position in the midclavicular line and access to the abdominal cavity was obtained under visualization. Pneumoperitoneum was then established without apparent complications. Underlying structures surveyed, no evidence of injury. After evaluation of the abdominal contents from this trocar position 5 other ports were placed under direct visualization. One at the umbilicus, one 2-3 cm above the umbilicus, one at the subxiphoid area, one in the right subcostal margin in the midclavicular line, and the last one further lateral to the original Optiview port. A solution of 0.5% Marcaine was used to infiltrate the subcutaneous tissues prior to trocar placement. The robot docked. There was no apparent hiatal hernia. After decompression of the stomach with the orogastric tube, the orogastric tube and any esophageal probes were removed from the patient. The stomach was grasped using a forceps and retracted caudally to expose the phrenoesophageal ligament. This was taken down with hook cautery. A blunt palpation probe was used to expose the esophagus and left bundle of the right jacey.   At this point we released our retraction on the stomach and measured for 5-7 cm from the angle of His along the lesser curvature. We made a window along the lesser curvature through the pars flaccida with the Vessel Sealer and began the dissection. This allowed entry into the lesser sac. Once the lesser sac was entered a 60 mm x 2.5 mm NANDO stapler was used to come across the neurovascular bundle. A 60 mm x 3.5 mm stapler then passed across the stomach and fired. 60 mm × 3.5 mm staple loads were used to progress cephalad toward the angle of His. A 30 mL gastric pouch was created. After this was done the Orvil anvil for the EEA stapler was placed transorally. This anvil was attached to an 25 [place] orogastric tube and was placed by anesthesia without complications. The orogastric tube was seen in the gastric pouch. The scissors was used to make a gastrotomy in the gastric pouch and the orogastric tube was pulled out of the gastric pouch. At this point the orogastric tube was detached from the anvil and removed, leaving the anvil in the gastric pouch. Having successfully completed our small gastric pouch and anvil placement, we turned our attention to dividing the greater omentum. This was accomplished with the Vessel Sealer. After this we identified the ligament of Treitz and the inferior mesenteric vein, and went 50 cm distal.  A 3-0 silk suture and clips were then placed to marely the proximal bowel and then we divided the bowel using a 60 mm × 2.5 mm NANDO stapler. The mesentery was also transected using a Vessel Sealer. After this was done we brought the efferent limb all the way up to the gastric pouch in between our previously created defect in the greater omentum. The staple line and the jejunum was opened using cautery and after enlarging the lateral trocar site the 25 mm EEA stapler was introduced into the abdomen and into the open jejunum. Using the EEA stapler we performed our gastrojejunostomy. The stapler was removed and 2 complete donuts were identified. The open ended jejunum was closed using a 60 mm x 2.5 mm NANDO stapler and bowel continuity reestablished. This amputated piece of jejunum was placed in an Endopouch and removed from the abdomen. We then measured distal on the small bowel and oriented our bowel 120 cm to create the jejunojejunostomy. The mesentery exteremely short  and hard to manipulate throughout the case. An enterotomy was created in each limb with the electrocautery  and using the triple stapling technique created a side-to-side jejunojejunostomy. 60 mm x 2.5 mm staple loads were utilized with 45 mm of the staple load. The opening was evaluated for hemostasis and care was taken to make sure the posterior wall of the anastomosis was free. This was directly visualized under laparoscopic visualization. The remaining free defect was closed with one or more firings of a 60 mm x 2.5 mm linear stapler. Numerous sutures of 3-0 Ethibond sutures were used to close the defect of the mesentery at the jejunojejunostomy. The piece of small bowel from the triple staple technique was then withdrawn from the abdomen. The staple line was noted to be intact and to have captured serosa the entire length of the anastomosis. The anastomosis appeared patent. Hemostasis obtained. Having restored bowel continuity we placed multiple interrupted sutures to reinforce the gastrojejunal anastomosis. Interrupted 3-0 Vicryl sutures were placed laterally, posterior laterally and anteriorly so that we could circumferentially reinforce the anastomosis. The bowel was pink and viable, and there was no tension on the anastomosis. Having completed our reinforced sutures we then proceeded with checking the anastomosis for leakage. After completing the gastrojejunostomy, upper GI endoscopy was performed in order to evaluate the integrity of the anastomosis.   The Olympus gastroscope was introduced through the mouth, through the esophagus and into the small gastric pouch. The anastomosis was noted to be patent widely. The lateral gastric staple line and gastrojejunal anastomosis were hemostatic. The scope passed through the anastomosis into the jejunum into both limbs without obstruction. The anastomosis was submerged under irrigation placed in the abdomen. There was no evidence of air extravasation on the intraoperative operative leak test performed. The air was evacuated and the scope removed from the patient. Penrose placed in left lateral incision. No specimens. The abdomen was then copiously irrigated and checked for hemostasis. The effluent was evacuated from the abdomen and then we then turned our attention to closure of the trocar sites. At this point prior to evacuation of the pneumoperitoneum we closed all our 12 mm ports with laparoscopic suture fascial closure device using 0 Vicryl. The pneumoperitoneum was then evacuated. The wounds were irrigated and found to be hemostatic. The skin was closed using 4-0 Vicryl in a running subcuticular fashion. Steri-Strips were applied to the wounds the patient was awakened from anesthesia extubated and taken to recovery in stable condition. The patient and her family updated to all operative findings.         Electronically signed by Jennifer Rodriguez DO on 12/13/2022 at 4:33 PM

## 2022-12-13 NOTE — ANESTHESIA PRE PROCEDURE
Department of Anesthesiology  Preprocedure Note       Name:  Stephanie Lam   Age:  47 y.o.  :  1970                                          MRN:  [id number]         Date:  2022      Surgeon: Marissa Lemus):  Andrew Montes DO    Procedure: Procedure(s):  XI ROBOTIC LAPAROSCOPIC GASTRIC BYPASS ROLANDO-EN-Y, LIVER BIOPSY, EGD- GI SCHEDULED    Medications prior to admission:   Prior to Admission medications    Medication Sig Start Date End Date Taking? Authorizing Provider   eszopiclone (LUNESTA) 3 MG TABS Take 1 tablet by mouth nightly. 10/19/22   Historical Provider, MD   ferrous sulfate (IRON 325) 325 (65 Fe) MG tablet Take 1 tablet by mouth daily 22   Historical Provider, MD   clonazePAM (KLONOPIN) 0.5 MG tablet Take 0.5 mg by mouth nightly as needed. Patient not taking: No sig reported 22  Historical Provider, MD   busPIRone (BUSPAR) 15 MG tablet TAKE 1 TABLET BY MOUTH EVERY DAY AT NIGHT  Patient not taking: No sig reported 22   Historical Provider, MD   pantoprazole (PROTONIX) 20 MG tablet Take 1 tablet by mouth in the morning. 22   Dorlene Hashimoto, MD   ARIPiprazole (ABILIFY) 5 MG tablet TAKE 1 TABLET BY MOUTH EVERY DAY 22   Historical Provider, MD   buPROPion [facility] SR) 100 MG extended release tablet Take 100 mg by mouth 2 times daily 22  Historical Provider, MD   etonogestrel-ethinyl estradiol (NUVARING) 0.12-0.015 MG/24HR vaginal ring INSERT 1 RING VAGINALLY EVERY 4 WEEKS, LEAVE IN PLACE 3 WEEKS, THEN REMOVE 22   Historical Provider, MD   levothyroxine (SYNTHROID) 88 MCG tablet Take 88 mcg by mouth Daily --two tablets   - -one tablet 3/3/22   Historical Provider, MD   lithium (LITHOBID) 300 MG extended release tablet 300 MG IN THE AM  MG AT NIGHT 22   Historical Provider, MD   traZODone (DESYREL) 100 MG tablet TAKE 2 TABLETS (200 MG) BY MOUTH NIGHTLY AS NEEDED FOR SLEEP.   Patient not taking: Reported on 2022 Historical Provider, MD       Current medications:    No current facility-administered medications for this visit. No current outpatient medications on file. Facility-Administered Medications Ordered in Other Visits   Medication Dose Route Frequency Provider Last Rate Last Admin    heparin (porcine) injection 5,000 Units  5,000 Units SubCUTAneous Once Ildefonso Drivers, DO        ceFAZolin (ANCEF) 2000 mg in sterile water 20 mL IV syringe  2,000 mg IntraVENous Once Ildeofnso Drivers, DO        metronidazole (FLAGYL) 500 mg in 0.9% NaCl 100 mL IVPB premix  500 mg IntraVENous Once Ildefonso Drivers, DO        lactated ringers infusion 1,000 mL  1,000 mL IntraVENous Continuous Nely Riley MD           Allergies:  No Known Allergies    Problem List:    Patient Active Problem List   Diagnosis Code    Acquired hypothyroidism E03.9    Arthritis M19.90    Depression F32. A    Obesity (BMI 30-39. 9) E66.9    MAURICE (obstructive sleep apnea) G47.33    Obesity, Class III, BMI 40-49.9 (morbid obesity) ([facility]) E66.01    Gastroesophageal reflux disease with esophagitis K21.00       Past Medical History:        Diagnosis Date    Anxiety     Arthritis     Asthma     Depression     GERD (gastroesophageal reflux disease)     Obesity     MAURICE on CPAP     11/28/2022 machine on back order    Thyroid disease     Under care of service provider 11/28/2022    psych-Dr rodrigues-last visit 10/2022    Under care of service provider 11/28/2022    pcp-tennille esposito-last visit oct 2022    Under care of service provider 11/28/2022    endocrine-moosa-basurto-last visit oct 2022       Past Surgical History:        Procedure Laterality Date   [address] ENDOMETRIAL ABLATION  2014    ESOPHAGOGASTRODUODENOSCOPY      TOTAL HIP ARTHROPLASTY Left 2016    TOTAL HIP ARTHROPLASTY Right 2020    TOTAL KNEE ARTHROPLASTY Right 2018    TUBAL LIGATION  2014    UPPER GASTROINTESTINAL ENDOSCOPY N/A 2022    EGD BIOPSY performed by Julissa Viera DO at [address] [facility] History:    Social History     Tobacco Use    Smoking status: Former     Packs/day: 1.00     Years: 35.00     Pack years: 35.00     Types: Cigarettes     Quit date:      Years since quittin.9    Smokeless tobacco: Never   Substance Use Topics    Alcohol use: Yes     Alcohol/week: 1.0 standard drink     Types: 1 Cans of beer per week     Comment: monthly                                Counseling given: Not Answered      Vital Signs (Current): There were no vitals filed for this visit. BP Readings from Last 3 Encounters:   22 124/70   22 (!) 141/89   22 121/88       NPO Status:                                                                                 BMI:   Wt Readings from Last 3 Encounters:   22 253 lb (114.8 kg)   22 264 lb (119.7 kg)   09/15/22 240 lb (108.9 kg)     There is no height or weight on file to calculate BMI.    CBC:   Lab Results   Component Value Date/Time    WBC 9.2 2022 11:48 AM    RBC 4.35 2022 11:48 AM    HGB 13.0 2022 11:48 AM    HCT 40.5 2022 11:48 AM    MCV 93.1 2022 11:48 AM    RDW 13.0 2022 11:48 AM     2022 11:48 AM       CMP:   Lab Results   Component Value Date/Time     2022 11:48 AM    K 4.7 2022 11:48 AM     2022 11:48 AM    CO2 16 2022 11:48 AM    BUN 16 2022 11:48 AM    CREATININE 0.61 2022 11:48 AM    LABGLOM >60 2022 11:48 AM    GLUCOSE 102 2022 11:48 AM    CALCIUM 9.3 2022 11:48 AM    BILITOT 0.4 2022 12:00 AM    ALKPHOS 47 2022 12:00 AM    AST 17 2022 12:00 AM    ALT 22 2022 12:00 AM       POC Tests: No results for input(s): POCGLU, POCNA, POCK, POCCL, POCBUN, POCHEMO, POCHCT in the last 72 hours.     Coags:   Lab Results   Component Value Date/Time    PROTIME 9.9 11/28/2022 11:48 AM    INR 0.9 11/28/2022 11:48 AM    APTT 21.6 11/28/2022 11:48 AM       HCG (If Applicable):   Lab Results   Component Value Date    HCG NEGATIVE 08/05/2022        ABGs: No results found for: PHART, PO2ART, XQK4CYA, GKZ2ZPC, BEART, M8MQDBXS     Type & Screen (If Applicable):  No results found for: LABABO, LABRH    Drug/Infectious Status (If Applicable):  No results found for: HIV, HEPCAB    COVID-19 Screening (If Applicable): No results found for: COVID19        Anesthesia Evaluation  Patient summary reviewed and Nursing notes reviewed no history of anesthetic complications:   Airway: Mallampati: II  TM distance: >3 FB   Neck ROM: full  Mouth opening: > = 3 FB   Dental: normal exam         Pulmonary:normal exam  breath sounds clear to auscultation  (+) COPD:  sleep apnea:  asthma: current smoker                           Cardiovascular:Negative CV ROS  Exercise tolerance: good (>4 METS),         ECG reviewed  Rhythm: regular  Rate: normal                    Neuro/Psych:   (+) psychiatric history:depression/anxiety             GI/Hepatic/Renal:   (+) GERD:,          ROS comment: Severe obesity. Endo/Other:    (+) hypothyroidism::., .                 Abdominal:   (+) obese,     Abdomen: soft. Vascular: Other Findings:             Anesthesia Plan      general     ASA 3       Induction: intravenous. MIPS: Postoperative opioids intended and Prophylactic antiemetics administered. Anesthetic plan and risks discussed with patient. Use of blood products discussed with patient whom. Plan discussed with CRNA.                     Erik Cee MD   12/13/2022

## 2022-12-14 ENCOUNTER — APPOINTMENT (OUTPATIENT)
Dept: GENERAL RADIOLOGY | Age: 52
End: 2022-12-14
Attending: SURGERY
Payer: COMMERCIAL

## 2022-12-14 VITALS
TEMPERATURE: 98 F | BODY MASS INDEX: 40.66 KG/M2 | WEIGHT: 253 LBS | HEART RATE: 85 BPM | SYSTOLIC BLOOD PRESSURE: 125 MMHG | HEIGHT: 66 IN | DIASTOLIC BLOOD PRESSURE: 82 MMHG | OXYGEN SATURATION: 95 % | RESPIRATION RATE: 16 BRPM

## 2022-12-14 LAB
ANION GAP SERPL CALCULATED.3IONS-SCNC: 12 MMOL/L (ref 9–17)
BUN BLDV-MCNC: 6 MG/DL (ref 6–20)
CALCIUM SERPL-MCNC: 8.5 MG/DL (ref 8.6–10.4)
CHLORIDE BLD-SCNC: 103 MMOL/L (ref 98–107)
CO2: 19 MMOL/L (ref 20–31)
CREAT SERPL-MCNC: 0.63 MG/DL (ref 0.5–0.9)
GFR SERPL CREATININE-BSD FRML MDRD: >60 ML/MIN/1.73M2
GLUCOSE BLD-MCNC: 137 MG/DL (ref 65–105)
GLUCOSE BLD-MCNC: 153 MG/DL (ref 70–99)
HCG, PREGNANCY URINE (POC): NEGATIVE
HCT VFR BLD CALC: 34.7 % (ref 36.3–47.1)
HEMOGLOBIN: 11.3 G/DL (ref 11.9–15.1)
MCH RBC QN AUTO: 29.8 PG (ref 25.2–33.5)
MCHC RBC AUTO-ENTMCNC: 32.6 G/DL (ref 28.4–34.8)
MCV RBC AUTO: 91.6 FL (ref 82.6–102.9)
NRBC AUTOMATED: 0 PER 100 WBC
PDW BLD-RTO: 13.4 % (ref 11.8–14.4)
PLATELET # BLD: 290 K/UL (ref 138–453)
PMV BLD AUTO: 8.8 FL (ref 8.1–13.5)
POTASSIUM SERPL-SCNC: 3.8 MMOL/L (ref 3.7–5.3)
RBC # BLD: 3.79 M/UL (ref 3.95–5.11)
SODIUM BLD-SCNC: 134 MMOL/L (ref 135–144)
WBC # BLD: 14 K/UL (ref 3.5–11.3)

## 2022-12-14 PROCEDURE — 74220 X-RAY XM ESOPHAGUS 1CNTRST: CPT

## 2022-12-14 PROCEDURE — 6370000000 HC RX 637 (ALT 250 FOR IP): Performed by: SURGERY

## 2022-12-14 PROCEDURE — 2580000003 HC RX 258: Performed by: SURGERY

## 2022-12-14 PROCEDURE — 6370000000 HC RX 637 (ALT 250 FOR IP)

## 2022-12-14 PROCEDURE — G0378 HOSPITAL OBSERVATION PER HR: HCPCS

## 2022-12-14 PROCEDURE — 94761 N-INVAS EAR/PLS OXIMETRY MLT: CPT

## 2022-12-14 PROCEDURE — 6360000002 HC RX W HCPCS: Performed by: SURGERY

## 2022-12-14 PROCEDURE — 96372 THER/PROPH/DIAG INJ SC/IM: CPT

## 2022-12-14 PROCEDURE — 82947 ASSAY GLUCOSE BLOOD QUANT: CPT

## 2022-12-14 PROCEDURE — 6360000004 HC RX CONTRAST MEDICATION: Performed by: SURGERY

## 2022-12-14 PROCEDURE — 80048 BASIC METABOLIC PNL TOTAL CA: CPT

## 2022-12-14 PROCEDURE — 36415 COLL VENOUS BLD VENIPUNCTURE: CPT

## 2022-12-14 PROCEDURE — 51798 US URINE CAPACITY MEASURE: CPT

## 2022-12-14 PROCEDURE — 85027 COMPLETE CBC AUTOMATED: CPT

## 2022-12-14 PROCEDURE — 94640 AIRWAY INHALATION TREATMENT: CPT

## 2022-12-14 PROCEDURE — 2500000003 HC RX 250 WO HCPCS: Performed by: SURGERY

## 2022-12-14 PROCEDURE — 2700000000 HC OXYGEN THERAPY PER DAY

## 2022-12-14 RX ORDER — PROMETHAZINE HYDROCHLORIDE 25 MG/1
25 TABLET ORAL EVERY 6 HOURS PRN
Qty: 30 TABLET | Refills: 0 | Status: SHIPPED | OUTPATIENT
Start: 2022-12-14

## 2022-12-14 RX ORDER — CYCLOBENZAPRINE HCL 10 MG
10 TABLET ORAL 3 TIMES DAILY PRN
Qty: 21 TABLET | Refills: 0 | Status: SHIPPED | OUTPATIENT
Start: 2022-12-14 | End: 2022-12-22

## 2022-12-14 RX ORDER — ENOXAPARIN SODIUM 100 MG/ML
40 INJECTION SUBCUTANEOUS 2 TIMES DAILY
Qty: 11.2 ML | Refills: 0 | Status: SHIPPED | OUTPATIENT
Start: 2022-12-14 | End: 2022-12-28

## 2022-12-14 RX ORDER — OXYCODONE HYDROCHLORIDE AND ACETAMINOPHEN 5; 325 MG/1; MG/1
1 TABLET ORAL EVERY 6 HOURS PRN
Qty: 28 TABLET | Refills: 0 | Status: SHIPPED | OUTPATIENT
Start: 2022-12-14 | End: 2022-12-21

## 2022-12-14 RX ADMIN — OXYCODONE HYDROCHLORIDE AND ACETAMINOPHEN 2 TABLET: 5; 325 TABLET ORAL at 10:38

## 2022-12-14 RX ADMIN — IPRATROPIUM BROMIDE AND ALBUTEROL SULFATE 1 AMPULE: .5; 3 SOLUTION RESPIRATORY (INHALATION) at 14:41

## 2022-12-14 RX ADMIN — OXYCODONE HYDROCHLORIDE AND ACETAMINOPHEN 2 TABLET: 5; 325 TABLET ORAL at 04:25

## 2022-12-14 RX ADMIN — IPRATROPIUM BROMIDE AND ALBUTEROL SULFATE 1 AMPULE: .5; 3 SOLUTION RESPIRATORY (INHALATION) at 08:47

## 2022-12-14 RX ADMIN — METRONIDAZOLE 500 MG: 500 INJECTION, SOLUTION INTRAVENOUS at 10:42

## 2022-12-14 RX ADMIN — DIATRIZOATE MEGLUMINE AND DIATRIZOATE SODIUM 30 ML: 660; 100 LIQUID ORAL; RECTAL at 09:17

## 2022-12-14 RX ADMIN — PANTOPRAZOLE SODIUM 40 MG: 40 TABLET, DELAYED RELEASE ORAL at 10:26

## 2022-12-14 RX ADMIN — Medication 2000 MG: at 00:25

## 2022-12-14 RX ADMIN — LITHIUM CARBONATE 600 MG: 300 TABLET ORAL at 00:18

## 2022-12-14 RX ADMIN — HYDROMORPHONE HYDROCHLORIDE 1 MG: 1 INJECTION, SOLUTION INTRAMUSCULAR; INTRAVENOUS; SUBCUTANEOUS at 00:54

## 2022-12-14 RX ADMIN — HEPARIN SODIUM 5000 UNITS: 5000 INJECTION INTRAVENOUS; SUBCUTANEOUS at 00:15

## 2022-12-14 RX ADMIN — HEPARIN SODIUM 5000 UNITS: 5000 INJECTION INTRAVENOUS; SUBCUTANEOUS at 15:00

## 2022-12-14 RX ADMIN — SODIUM CHLORIDE, POTASSIUM CHLORIDE, SODIUM LACTATE AND CALCIUM CHLORIDE: 600; 310; 30; 20 INJECTION, SOLUTION INTRAVENOUS at 07:15

## 2022-12-14 RX ADMIN — CYCLOBENZAPRINE 10 MG: 10 TABLET, FILM COATED ORAL at 14:59

## 2022-12-14 RX ADMIN — LITHIUM CARBONATE 300 MG: 300 TABLET ORAL at 07:14

## 2022-12-14 RX ADMIN — HEPARIN SODIUM 5000 UNITS: 5000 INJECTION INTRAVENOUS; SUBCUTANEOUS at 05:50

## 2022-12-14 RX ADMIN — METRONIDAZOLE 500 MG: 500 INJECTION, SOLUTION INTRAVENOUS at 01:21

## 2022-12-14 RX ADMIN — Medication 2000 MG: at 10:21

## 2022-12-14 RX ADMIN — HYDROMORPHONE HYDROCHLORIDE 1 MG: 1 INJECTION, SOLUTION INTRAMUSCULAR; INTRAVENOUS; SUBCUTANEOUS at 06:15

## 2022-12-14 ASSESSMENT — PAIN SCALES - GENERAL
PAINLEVEL_OUTOF10: 8
PAINLEVEL_OUTOF10: 9
PAINLEVEL_OUTOF10: 9
PAINLEVEL_OUTOF10: 8
PAINLEVEL_OUTOF10: 9
PAINLEVEL_OUTOF10: 10

## 2022-12-14 ASSESSMENT — PAIN DESCRIPTION - LOCATION: LOCATION: ABDOMEN

## 2022-12-14 NOTE — PROGRESS NOTES
Bariatric Surgery Progress Note            PATIENT NAME: Jn Ledezma     TODAY'S DATE: 12/14/2022, 8:00 AM      SUBJECTIVE:    Pt seen and examined at bedside. Tolerable pain with current pain regimen. Tolerating bariatric CLD. Ambulating. Denies N/V/F/C. Voiding with some difficulty starting a stream, but able to pass urine without large PVR. No other complaints noted. OBJECTIVE:   VITALS:  BP (!) 132/93   Pulse (!) 112   Temp 98.9 °F (37.2 °C) (Oral)   Resp 18   Ht 5' 5.5\" (1.664 m)   Wt 253 lb (114.8 kg)   SpO2 95%   BMI 41.46 kg/m²      INTAKE/OUTPUT:      Intake/Output Summary (Last 24 hours) at 12/14/2022 0800  Last data filed at 12/14/2022 0400  Gross per 24 hour   Intake 1500 ml   Output 460 ml   Net 1040 ml       CONSTITUTIONAL:  NAD, A&O x 3  LUNGS:  normal effort with symmetric rise and fall of chest wall  CARDIOVASCULAR:  regular rate and rhythm  ABDOMEN: Soft, nttp, nd, wounds clean, dry and intact with dermabond; no erythema induration or exudate; abdominal binder in place.  Dressings with minimal strikethrough  EXTREMITIES:  Warm, no cyanosis or edema      Data:  CBC with Differential:    Lab Results   Component Value Date/Time    WBC 14.0 12/14/2022 04:32 AM    RBC 3.79 12/14/2022 04:32 AM    HGB 11.3 12/14/2022 04:32 AM    HCT 34.7 12/14/2022 04:32 AM     12/14/2022 04:32 AM    MCV 91.6 12/14/2022 04:32 AM    MCH 29.8 12/14/2022 04:32 AM    MCHC 32.6 12/14/2022 04:32 AM    RDW 13.4 12/14/2022 04:32 AM    LYMPHOPCT 28.3 07/27/2022 12:00 AM    MONOPCT 6.2 07/27/2022 12:00 AM    EOSPCT 1.3 07/27/2022 12:00 AM    BASOPCT 0.5 07/27/2022 12:00 AM    MONOSABS 0.5 07/27/2022 12:00 AM    LYMPHSABS 2.1 07/27/2022 12:00 AM    EOSABS 0.1 07/27/2022 12:00 AM    BASOSABS 0.0 07/27/2022 12:00 AM     BMP:    Lab Results   Component Value Date/Time     12/14/2022 04:32 AM    K 3.8 12/14/2022 04:32 AM     12/14/2022 04:32 AM    CO2 19 12/14/2022 04:32 AM    BUN 6 12/14/2022 04:32 AM    LABALBU 4.1 07/27/2022 12:00 AM    CREATININE 0.63 12/14/2022 04:32 AM    CALCIUM 8.5 12/14/2022 04:32 AM    LABGLOM >60 12/14/2022 04:32 AM    GLUCOSE 153 12/14/2022 04:32 AM         ASSESSMENT   Patient Active Problem List   Diagnosis    Acquired hypothyroidism    Arthritis    Depression    Obesity (BMI 30-39.9)    MAURICE (obstructive sleep apnea)    Obesity, Class III, BMI 40-49.9 (morbid obesity) (HCC)    Gastroesophageal reflux disease with esophagitis    Status post gastric bypass for obesity       47 y.o. F POD#1 s/p robotic assisted Gordy-en-Y gastric bypass, EGD  2/2 morbid obesity      Plan  Continue bariatric CLD. Esophagram today  IVF at 125 ml/hr with LR  F/u AM labs  Pain control with Percocet and Dilaudid PRN. Nausea control with Zofran and scopolamine PRN  Encourage ambulation and IS usages. OOB and onto chair  DVT prophylaxis with Heparin TID. GI prophylaxis with Protonix. Dispo:  Anticipate home today after esophagram    Electronically signed by Alcira Tafoya DO  on 12/14/2022 at 8:00 AM

## 2022-12-14 NOTE — ANESTHESIA POSTPROCEDURE EVALUATION
Department of Anesthesiology  Postprocedure Note    Patient: Zahraa Alvarado  MRN: [id number]  YOB: 1970  Date of evaluation: 12/13/2022      Procedure Summary     Date: 12/13/22 Room / Location: [address]    Anesthesia Start: 5743 Anesthesia Stop: 5038    Procedure: XI ROBOTIC LAPAROSCOPIC GASTRIC BYPASS ROLANDO-EN-Y, EGD- GI SCHEDULED Diagnosis:       Morbid obesity ([facility] [address]      Obstructive sleep apnea (adult) (pediatric)      Gastroesophageal reflux disease with esophagitis, unspecified whether hemorrhage      (Morbid obesity ([facility] [address] [E66.01])      (Obstructive sleep apnea (adult) (pediatric) [G47.33])      (Gastroesophageal reflux disease with esophagitis, unspecified whether hemorrhage [K21.00])    Surgeons: Olaf Sultana DO Responsible Provider: Wong Leyva MD    Anesthesia Type: general ASA Status: 3          Anesthesia Type: No value filed.     Cleopatra Phase I:      Cleopatra Phase II:      POST-OP ANESTHESIA NOTE       BP (!) 147/92   Pulse (!) 104   Temp 97.5 °F (36.4 °C) (Temporal)   Resp 25   Ht 5' 5.5\" (1.664 m)   Wt 253 lb (114.8 kg)   SpO2 97%   BMI 41.46 kg/m²    Pain Assessment: Face, Legs, Activity, Cry, and Consolability (FLACC)  Pain Level: 7     Anesthesia Post Evaluation    Patient location during evaluation: PACU  Patient participation: complete - patient participated  Level of consciousness: awake  Pain score: 7  Airway patency: patent  Nausea & Vomiting: no vomiting and no nausea  Complications: no  Cardiovascular status: hemodynamically stable  Respiratory status: acceptable  Hydration status: stable

## 2022-12-14 NOTE — PROGRESS NOTES
CLINICAL PHARMACY NOTE: MEDS TO BEDS    Total # of Prescriptions Filled: 4   The following medications were delivered to the patient:  Lovenox  Promethazine  Flexeril  percocet    Additional Documentation:

## 2022-12-14 NOTE — PLAN OF CARE
Problem: Discharge Planning  Goal: Discharge to home or other facility with appropriate resources  12/14/2022 1559 by Daphne Dailey RN  Outcome: Completed  12/14/2022 0619 by Pj Eduardo RN  Outcome: Progressing     Problem: Pain  Goal: Verbalizes/displays adequate comfort level or baseline comfort level  12/14/2022 1559 by Daphne Dailey RN  Outcome: Completed  12/14/2022 0619 by Pj Eduardo RN  Outcome: Progressing

## 2022-12-14 NOTE — PROGRESS NOTES
Discussed bariatric discharge instructions with patient, allowed time for questions, had patient demonstrate IS, LUQ incision penrose intact, serosang drainage, taught patient how to change dressing, lovenox teaching done and patient voices understanding

## 2022-12-14 NOTE — PROGRESS NOTES
Pt states that she is waiting for her home CPAP to be delivered. Pt did not wish to use hospital CPAP for the night. Pt in no respiratory distress at this time.

## 2022-12-15 ENCOUNTER — TELEPHONE (OUTPATIENT)
Dept: BARIATRICS/WEIGHT MGMT | Age: 52
End: 2022-12-15

## 2022-12-22 ENCOUNTER — OFFICE VISIT (OUTPATIENT)
Dept: BARIATRICS/WEIGHT MGMT | Age: 52
End: 2022-12-22

## 2022-12-22 VITALS
TEMPERATURE: 97.1 F | HEIGHT: 66 IN | HEART RATE: 88 BPM | WEIGHT: 253 LBS | DIASTOLIC BLOOD PRESSURE: 74 MMHG | BODY MASS INDEX: 40.66 KG/M2 | RESPIRATION RATE: 20 BRPM | SYSTOLIC BLOOD PRESSURE: 112 MMHG

## 2022-12-22 DIAGNOSIS — Z98.84 S/P GASTRIC BYPASS: Primary | ICD-10-CM

## 2022-12-22 PROCEDURE — 99024 POSTOP FOLLOW-UP VISIT: CPT | Performed by: SURGERY

## 2022-12-22 RX ORDER — PANTOPRAZOLE SODIUM 20 MG/1
20 TABLET, DELAYED RELEASE ORAL DAILY
Qty: 30 TABLET | Refills: 3 | Status: SHIPPED | OUTPATIENT
Start: 2022-12-22

## 2022-12-22 RX ORDER — CYCLOBENZAPRINE HCL 10 MG
10 TABLET ORAL 3 TIMES DAILY PRN
Qty: 21 TABLET | Refills: 0 | Status: SHIPPED | OUTPATIENT
Start: 2022-12-22 | End: 2023-01-01

## 2022-12-22 NOTE — PROGRESS NOTES
Medical Nutrition Therapy  Metabolic and Bariatric surgery  1 week Post operative follow up         Pt reports: She is tolerating liquids well, drinking protein shake, and starting vitamins. Vitals:   Vitals:    12/22/22 1440   BP: 112/74   Site: Right Upper Arm   Position: Sitting   Cuff Size: Large Adult   Pulse: 88   Resp: 20   Temp: 97.1 °F (36.2 °C)   TempSrc: Temporal   Weight: 253 lb (114.8 kg)   Height: 5' 5.5\" (1.664 m)      Body mass index is 41.46 kg/m². Labs reviewed:              Nutrition Assessment:   PES: Inadequate food and beverage intake r/t WLS as evidenced by loss of excess body weight stable / unchanged in 1 week.       Goals   60-80gm of protein  48-64oz of fluid       [x] met     []  Not met        Plan:  Pt questions answered re diet advancement;  F/u 5 weeks post-op      Taj Werner, MS, RD, LD

## 2022-12-22 NOTE — PROGRESS NOTES
[address] [facility] INVASIVE BARIATRIC SURG  [address]  [address]  [place] [address]  Dept: [phone]    SURGICAL WEIGHT LOSS MANAGEMENT PROGRAM  PROGRESS NOTE     CC: Weight Loss    Patient: Edwin Morgan Date: 12/22/2022  Visit:   1 week    Medical Record #: [id number]  Date of Surgery:   12/13/2022    History: 47 y.o. female who presents today for a post op follow up for gastric bypass. Patient reports regular bowel movements. She states she has not been taking a PPI, and she notes frequent GERD symptoms. She denies nausea, vomiting, fever, and chills. She states the pain is well controlled. Height: 5' 5.5\" (1.664 m)  Highest Weight:   264 lbs      Current Visit Weight Information  Weight: 253 lb (114.8 kg)   BMI: Body mass index is 41.46 kg/m². Weight loss since surgery:  11 lbs    1 wk - D/C'd home on VTE Prohylaxis? [x] Yes   [] No   On VTE Proph as directed? [x] Yes   [] No    Liver pathology:    [] NA    [] No Gross path    [] Liver Steatosis   [x] Discussed w/ pt   [] Referred to GI    Exercising?    [] Yes    [x] No     Review of Systems:     General  Negative for: [] Weight Change   [x] Fatigue   [x] Fevers & Chills    [] Appetite change [] Other:    Positive for: [x] Weight Change   [] Fatigue   [] Fevers & Chills    [] Appetite change [] Other:   Cardiac  Negative for: [x] Chest Pain   [] Difficulty Breathing   [] Leg Cramps [x] Edema of Lower Extremeties    [] Left   [] Right      Positive for: [] Chest Pain   [] Difficulty Breathing   [] Leg Cramps [] Edema of Lower Extremeties    [] Left   [] Right   Pulmonary  Negative for: [x] Shortness of Breath [] Wheeze [x] Cough  [x] Calf Pain     Positive for: [] Shortness of Breath [] Wheeze [] Cough  [] Calf Pain   Gastro-Intestinal Negative for: [] Heartburn   [] Reflux   [] Dysphagia   [] Melena   [] BRBPR  [x] Vomiting   [x] Abdominal Pain   [] Diarrhea     [] Constipation  [] Other:     Positive for: [] Heartburn   [] Reflux   [] Dysphagia   [] Melena   [] BRBPR  [] Vomiting   [] Abdominal Pain   [] Diarrhea     [] Constipation  [] Other:    Muskuloskeletal Negative for: [] Joint pain   [] Back pain   [] Knee Pain   [] Muscle weakness [x] Hernia   [x] Edema [] Other:     Positive for: [] Joint pain   [] Back pain   [] Knee Pain   [] Muscle weakness [] Hernia   [] Edema [] Other:    Neurologic Negative for: [x] Syncope   [] Insomnia   [x] Being treated for depression  [] Other:     Positive for: [] Syncope   [] Insomnia   [] Being treated for depression  [] Other:    Skin Negative for: [x] Wound:   [] Open   [] Draining   [] Incisional     [x] Rash   [] Hair Loss  [] Other:     Positive for: [] Wound:   [] Open   [] Draining    [] Incisional  [] Rash   [] Hair Loss  [] Other:          Physical Assessment:     /74 (Site: Right Upper Arm, Position: Sitting, Cuff Size: Large Adult)   Pulse 88   Temp 97.1 °F (36.2 °C) (Temporal)   Resp 20   Ht 5' 5.5\" (1.664 m)   Wt 253 lb (114.8 kg)   BMI 41.46 kg/m²   General Negative for:  [x] Lapses in memory   [x] Unusual Stress   [x] Diffic Concen [] Unable to sleep   [] Eating in response to stress   [] Other:    Positive for:  [] Lapses in memory   [] Unusual Stress   [] Diffic Concen [] Unable to sleep   [] Eating in response to stress   [] Other:   Cardio-Pulmonary   [x] Heart RRR   [x] No murmurs   [] Pulses nl x4 extrem    [x] Good inspiratory effort   [x] No wheezing     [x] Lungs clear to auscultation bilaterally    [] Other:    Gastro-Intestinal   [x] Abd S/NT/ND/Benign   [x] No abdominal mass/hernia    [x] No Splenomegaly    [] Other:    Muskuloskeletal   [x] Good muscle strength x4 extremities   [x] nl gait and ambul    [x] Nl ROM x4 extremities    [] Other:    Neurologic   [x] Alert and oriented x3    [] Other:   Skin   [x] Intact w/ no open wounds   [x] Incisions C/D/I    [] Steri strips removed   [x] No drainage or Infection [] Other:      Assessment & Plan:      1. S/P gastric bypass           [x] Advance Diet    [x] Daily protein (65-75gm/day)   [x] Take Vitamins   [x] Attend Support Group    [x] Exercise Regularly   [x] Use contraception    Diet progression discussed  Need for long term compliance and follow up stressed to patient  Dietician consult  Continue taking daily Vitamins  Continue using Lovenox injections, as prescribed  Continue taking Protonix 40 mg, as prescribed  Continue taking Flexeril 10 mg, as needed for pain  Doing well    Follow up: 1 month    Orders placed this encounter:   No orders of the defined types were placed in this encounter. New Prescriptions:   Orders Placed This Encounter   Medications    pantoprazole (PROTONIX) 20 MG tablet     Sig: Take 1 tablet by mouth daily     Dispense:  30 tablet     Refill:  3    cyclobenzaprine (FLEXERIL) 10 MG tablet     Sig: Take 1 tablet by mouth 3 times daily as needed for Muscle spasms     Dispense:  21 tablet     Refill:  0        Scribe Attestation:  Ramirez Phelan, scribed on behalf of Sarah Patricio DO. Electronically signed by Sarah Patricio DO on 12/22/2022 at 3:00 PM    Please note that this chart was generated using voice recognition Dragon dictation software. Although every effort was made to ensure the accuracy of this automated transcription, some errors in transcription may have occurred. Beth Chiu DO DO, personally performed the services described in this documentation. All medical record entries made by the scribe were at my direction and in my presence. I have reviewed the chart and discharge instructions (if applicable) and agree that the record reflects my personal performance and is accurate and complete.     Electronically Signed: Sarah Patricio DO. 12/30/22. 10:51 AM.

## 2022-12-27 PROBLEM — E66.01 MORBID OBESITY DUE TO EXCESS CALORIES (HCC): Status: ACTIVE | Noted: 2022-12-27

## 2023-02-02 ENCOUNTER — OFFICE VISIT (OUTPATIENT)
Dept: BARIATRICS/WEIGHT MGMT | Age: 53
End: 2023-02-02

## 2023-02-02 VITALS
HEIGHT: 66 IN | HEART RATE: 96 BPM | WEIGHT: 226 LBS | SYSTOLIC BLOOD PRESSURE: 127 MMHG | BODY MASS INDEX: 36.32 KG/M2 | DIASTOLIC BLOOD PRESSURE: 91 MMHG

## 2023-02-02 DIAGNOSIS — G47.33 OSA (OBSTRUCTIVE SLEEP APNEA): Primary | ICD-10-CM

## 2023-02-02 DIAGNOSIS — E03.9 ACQUIRED HYPOTHYROIDISM: ICD-10-CM

## 2023-02-02 DIAGNOSIS — M19.90 ARTHRITIS: ICD-10-CM

## 2023-02-02 DIAGNOSIS — Z98.84 STATUS POST GASTRIC BYPASS FOR OBESITY: ICD-10-CM

## 2023-02-02 DIAGNOSIS — E66.9 OBESITY (BMI 30-39.9): ICD-10-CM

## 2023-02-02 PROCEDURE — 99024 POSTOP FOLLOW-UP VISIT: CPT | Performed by: NURSE PRACTITIONER

## 2023-02-02 NOTE — PROGRESS NOTES
Post-op Bariatric Surgery Note    Subjective     Patient is 1 months s/p laparoscopic Rolando-en-Y gastric bypass, down 27 lbs. Overall, doing well. Incisions well healed. Consistent use of MVI and calcium. Tolerating po intake, variety of foods. Getting adequate fluids. Bowel function normal.  Physical activity includes walking. No pain or other specific problems or concerns. Allergies:  No Known Allergies    Past Medical History:     Past Medical History:   Diagnosis Date    Anxiety     Arthritis     Asthma     Depression     GERD (gastroesophageal reflux disease)     Obesity     MAURICE on CPAP     11/28/2022 machine on back order    Thyroid disease     Under care of service provider 11/28/2022    psych-Dr rodrigues-last visit 10/2022    Under care of service provider 11/28/2022    pcp-tennille esposito-last visit oct 2022    Under care of service provider 11/28/2022    endocrine-enrique-last visit oct 2022   .     Past Surgical History:  Past Surgical History:   Procedure Laterality Date    ANKLE SURGERY  1996    ENDOMETRIAL ABLATION  2014    ESOPHAGOGASTRODUODENOSCOPY      ROLANDO-EN-Y GASTRIC BYPASS  12/13/2022    ROBOTIC LAPAROSCOPIC GASTRIC BYPASS ROLANDO-EN-Y, EGD-    ROLANDO-EN-Y GASTRIC BYPASS N/A 12/13/2022    XI ROBOTIC LAPAROSCOPIC GASTRIC BYPASS ROLANDO-EN-Y, EGD- GI SCHEDULED performed by Tereza Iglesias DO at [address] [facility] Left 2016    TOTAL HIP ARTHROPLASTY Right 2020    TOTAL KNEE ARTHROPLASTY Right 2018    TUBAL LIGATION  2014    UPPER GASTROINTESTINAL ENDOSCOPY N/A 08/05/2022    EGD BIOPSY performed by Tereaz Iglesias DO at [address] [facility].       Family History:  Family History   Problem Relation Age of Onset    No Known Problems Mother     Epilepsy Father        Social History:  Social History     Socioeconomic History    Marital status:      Spouse name: Not on file    Number of children: Not on file    Years of education: Not on file Highest education level: Not on file   Occupational History    Not on file   Tobacco Use    Smoking status: Former     Packs/day: 1.00     Years: 35.00     Pack years: 35.00     Types: Cigarettes     Quit date: 2020     Years since quitting: 3.0    Smokeless tobacco: Never   Substance and Sexual Activity    Alcohol use: Yes     Alcohol/week: 1.0 standard drink     Types: 1 Cans of beer per week     Comment: monthly    Drug use: Not Currently     Comment: cbd gummies    Sexual activity: Not on file   Other Topics Concern    Not on file   Social History Narrative    Not on file     Social Determinants of Health     Financial Resource Strain: Not on file   Food Insecurity: Not on file   Transportation Needs: Not on file   Physical Activity: Not on file   Stress: Not on file   Social Connections: Not on file   Intimate Partner Violence: Not on file   Housing Stability: Not on file       Current Medications:  Current Outpatient Medications   Medication Sig Dispense Refill    pantoprazole (PROTONIX) 20 MG tablet Take 1 tablet by mouth daily 30 tablet 3    promethazine (PHENERGAN) 25 MG tablet Take 1 tablet by mouth every 6 hours as needed for Nausea 30 tablet 0    lithium 300 MG capsule Take 600 mg by mouth nightly      eszopiclone (LUNESTA) 3 MG TABS Take 1 tablet by mouth nightly. ARIPiprazole (ABILIFY) 5 MG tablet Take 10 mg by mouth daily      buPROPion (WELLBUTRIN SR) 100 MG extended release tablet Take 100 mg by mouth 2 times daily      etonogestrel-ethinyl estradiol (NUVARING) 0.12-0.015 MG/24HR vaginal ring INSERT 1 RING VAGINALLY EVERY 4 WEEKS, LEAVE IN PLACE 3 WEEKS, THEN REMOVE      levothyroxine (SYNTHROID) 88 MCG tablet Take 88 mcg by mouth Daily M-W-F-two tablets   T-TH-S-S -one tablet      lithium 300 MG capsule Take 300 mg by mouth Daily (Patient not taking: Reported on 2/2/2023)       No current facility-administered medications for this visit.        Vital Signs:  BP (!) 127/91 (Site: Right Upper Arm, Position: Sitting, Cuff Size: Large Adult)   Pulse 96   Ht 5' 5.5\" (1.664 m)   Wt 226 lb (102.5 kg)   BMI 37.04 kg/m²     BMI/Height/Weight:  Body mass index is 37.04 kg/m². Review of Systems - A review of systems was performed. All was negative unless otherwise documented in HPI. Constitutional: Negative for fever, chills. HENT: Negative for trouble swallowing. Eyes: Negative for visual disturbance. Respiratory: Negative for cough, shortness of breath. Cardiovascular: Negative for chest pain and palpitations. Gastrointestinal: Negative for nausea, vomiting, abdominal pain, diarrhea, constipation, blood in stool and abdominal distention. Endocrine: Negative for polydipsia, polyphagia and polyuria. Genitourinary: Negative for dysuria, frequency, hematuria and difficulty urinating. Musculoskeletal: Negative for myalgias, joint swelling. Skin: Negative for pallor and rash. Neurological: Negative for dizziness, tremors, light-headedness and headaches. Psychiatric/Behavioral: Negative for sleep disturbance and dysphoric mood. Objective:      Physical Exam   Vital signs reviewed. General: Well-developed and well-nourished. No acute distress. Skin: Warm, dry and intact. HEENT: Normocephalic. EOMs intact. Conjunctivae normal. Neck supple. Cardiovascular: Normal rate, regular rhythm. Pulmonary/Chest: Normal effort. Lungs clear to auscultation. No rales, rhonchi or wheezing. Abdominal: Positive bowel sounds. Soft, nontender. Nondistended. No rigidity, rebound, or guarding. Incisions well healed. Musculoskeletal: Movement x4. No edema. Neurological: Gait normal. Alert and oriented to person, place, and time. Psychiatric: Normal mood and affect. Speech and behavior normal. Judgment and thought content normal. Cognition and memory intact. Assessment:       Diagnosis Orders   1.  MAURICE (obstructive sleep apnea)  CBC with Auto Differential    Comprehensive Metabolic Panel    Hemoglobin A1C    Ferritin    Iron and TIBC    Lipid Panel    Magnesium    PTH, Intact    TSH    Vitamin B1    Vitamin A    Vitamin B12 & Folate    Vitamin D 25 Hydroxy    Zinc      2. Obesity (BMI 30-39. 9)  CBC with Auto Differential    Comprehensive Metabolic Panel    Hemoglobin A1C    Ferritin    Iron and TIBC    Lipid Panel    Magnesium    PTH, Intact    TSH    Vitamin B1    Vitamin A    Vitamin B12 & Folate    Vitamin D 25 Hydroxy    Zinc      3. Status post gastric bypass for obesity  CBC with Auto Differential    Comprehensive Metabolic Panel    Hemoglobin A1C    Ferritin    Iron and TIBC    Lipid Panel    Magnesium    PTH, Intact    TSH    Vitamin B1    Vitamin A    Vitamin B12 & Folate    Vitamin D 25 Hydroxy    Zinc      4. Acquired hypothyroidism  CBC with Auto Differential    Comprehensive Metabolic Panel    Hemoglobin A1C    Ferritin    Iron and TIBC    Lipid Panel    Magnesium    PTH, Intact    TSH    Vitamin B1    Vitamin A    Vitamin B12 & Folate    Vitamin D 25 Hydroxy    Zinc      5. Arthritis  CBC with Auto Differential    Comprehensive Metabolic Panel    Hemoglobin A1C    Ferritin    Iron and TIBC    Lipid Panel    Magnesium    PTH, Intact    TSH    Vitamin B1    Vitamin A    Vitamin B12 & Folate    Vitamin D 25 Hydroxy    Zinc          Plan:    No dietitian visit today. Patient to continue to increase protein to obtain 60-80g/day, at least 48-64oz of fluid daily, and gradually increase exercise regimen. Bariatric post-op labs ordered. Follow-up  Return in about 2 months (around 4/2/2023).     Orders this encounter:  Orders Placed This Encounter   Procedures    CBC with Auto Differential     Standing Status:   Future     Standing Expiration Date:   2/2/2024    Comprehensive Metabolic Panel     Standing Status:   Future     Standing Expiration Date:   2/2/2024    Hemoglobin A1C     Standing Status:   Future     Standing Expiration Date:   2/2/2024    Ferritin     Standing Status: Future     Standing Expiration Date:   2/2/2024    Iron and TIBC     Standing Status:   Future     Standing Expiration Date:   2/2/2024     Order Specific Question:   Is Patient Fasting? Answer:   no     Order Specific Question:   No of Hours? Answer:   0    Lipid Panel     Standing Status:   Future     Standing Expiration Date:   2/2/2024     Order Specific Question:   Is Patient Fasting?/# of Hours     Answer:   12    Magnesium     Standing Status:   Future     Standing Expiration Date:   2/2/2024    PTH, Intact     Standing Status:   Future     Standing Expiration Date:   2/2/2024    TSH     Standing Status:   Future     Standing Expiration Date:   2/2/2024    Vitamin B1     Standing Status:   Future     Standing Expiration Date:   2/2/2024    Vitamin A     Standing Status:   Future     Standing Expiration Date:   2/2/2024    Vitamin B12 & Folate     Standing Status:   Future     Standing Expiration Date:   2/2/2024    Vitamin D 25 Hydroxy     Standing Status:   Future     Standing Expiration Date:   2/2/2024    Zinc     Standing Status:   Future     Standing Expiration Date:   2/2/2024       Prescriptions this encounter:  No orders of the defined types were placed in this encounter.       Electronically signed by:  Starr Hou CNP

## 2023-03-24 RX ORDER — PANTOPRAZOLE SODIUM 20 MG/1
TABLET, DELAYED RELEASE ORAL
Qty: 90 TABLET | Refills: 1 | Status: SHIPPED | OUTPATIENT
Start: 2023-03-24

## 2023-04-10 LAB
ALBUMIN SERPL-MCNC: 4 G/DL
ALP BLD-CCNC: 76 U/L
ALT SERPL-CCNC: 29 U/L
ANION GAP SERPL CALCULATED.3IONS-SCNC: 11 MMOL/L
AST SERPL-CCNC: 17 U/L
AVERAGE GLUCOSE: 108
BASOPHILS ABSOLUTE: 0 /ΜL
BASOPHILS RELATIVE PERCENT: 0.5 %
BILIRUB SERPL-MCNC: 0.4 MG/DL (ref 0.1–1.4)
BUN BLDV-MCNC: 13 MG/DL
CALCIUM SERPL-MCNC: 9.3 MG/DL
CHLORIDE BLD-SCNC: 109 MMOL/L
CHOLESTEROL, TOTAL: 187 MG/DL
CHOLESTEROL/HDL RATIO: 3.4
CO2: 20 MMOL/L
CREAT SERPL-MCNC: 0.94 MG/DL
EGFR: NORMAL
EOSINOPHILS ABSOLUTE: 0.1 /ΜL
EOSINOPHILS RELATIVE PERCENT: 0.6 %
FERRITIN: 132 NG/ML (ref 9–150)
FOLATE: NORMAL
GLUCOSE BLD-MCNC: 112 MG/DL
HBA1C MFR BLD: 5.4 %
HCT VFR BLD CALC: 36.5 % (ref 36–46)
HDLC SERPL-MCNC: 55 MG/DL (ref 35–70)
HEMOGLOBIN: 12.3 G/DL (ref 12–16)
IRON: 77
LDL CHOLESTEROL CALCULATED: 101 MG/DL (ref 0–160)
LYMPHOCYTES ABSOLUTE: 2.7 /ΜL
LYMPHOCYTES RELATIVE PERCENT: 32.4 %
MAGNESIUM: 2.1 MG/DL
MCH RBC QN AUTO: 30.3 PG
MCHC RBC AUTO-ENTMCNC: 33.8 G/DL
MCV RBC AUTO: 90 FL
MONOCYTES ABSOLUTE: 0.5 /ΜL
MONOCYTES RELATIVE PERCENT: 5.8 %
NEUTROPHILS ABSOLUTE: 5.1 /ΜL
NEUTROPHILS RELATIVE PERCENT: 60.7 %
NONHDLC SERPL-MCNC: NORMAL MG/DL
PDW BLD-RTO: 14.3 %
PLATELET # BLD: 313 K/ΜL
PMV BLD AUTO: 7.5 FL
POTASSIUM SERPL-SCNC: 4.1 MMOL/L
PTH INTACT: 36
RBC # BLD: 4.06 10^6/ΜL
SODIUM BLD-SCNC: 140 MMOL/L
TOTAL IRON BINDING CAPACITY: 466
TOTAL PROTEIN: 6.9
TRIGL SERPL-MCNC: 155 MG/DL
TSH SERPL DL<=0.05 MIU/L-ACNC: 0.89 UIU/ML
VITAMIN B-12: 902
VITAMIN D 25-HYDROXY: 49.4
VITAMIN D2, 25 HYDROXY: NORMAL
VITAMIN D3,25 HYDROXY: NORMAL
VLDLC SERPL CALC-MCNC: NORMAL MG/DL
WBC # BLD: 8.3 10^3/ML

## 2023-04-12 PROBLEM — K21.00 GASTROESOPHAGEAL REFLUX DISEASE WITH ESOPHAGITIS: Status: RESOLVED | Noted: 2022-08-11 | Resolved: 2023-04-12

## 2023-04-25 DIAGNOSIS — E03.9 ACQUIRED HYPOTHYROIDISM: ICD-10-CM

## 2023-04-25 DIAGNOSIS — M19.90 ARTHRITIS: ICD-10-CM

## 2023-04-25 DIAGNOSIS — Z98.84 STATUS POST GASTRIC BYPASS FOR OBESITY: ICD-10-CM

## 2023-04-25 DIAGNOSIS — E66.9 OBESITY (BMI 30-39.9): ICD-10-CM

## 2023-04-25 DIAGNOSIS — G47.33 OSA (OBSTRUCTIVE SLEEP APNEA): ICD-10-CM

## 2023-07-03 DIAGNOSIS — E03.9 ACQUIRED HYPOTHYROIDISM: ICD-10-CM

## 2023-07-03 DIAGNOSIS — M19.90 ARTHRITIS: ICD-10-CM

## 2023-07-03 DIAGNOSIS — E66.9 OBESITY (BMI 30-39.9): ICD-10-CM

## 2023-07-03 DIAGNOSIS — Z98.84 STATUS POST GASTRIC BYPASS FOR OBESITY: ICD-10-CM

## 2023-07-03 DIAGNOSIS — G47.33 OSA (OBSTRUCTIVE SLEEP APNEA): ICD-10-CM

## 2023-07-11 DIAGNOSIS — E03.9 ACQUIRED HYPOTHYROIDISM: ICD-10-CM

## 2023-07-11 DIAGNOSIS — Z98.84 STATUS POST GASTRIC BYPASS FOR OBESITY: ICD-10-CM

## 2023-07-11 DIAGNOSIS — G47.33 OSA (OBSTRUCTIVE SLEEP APNEA): ICD-10-CM

## 2023-07-11 DIAGNOSIS — M19.90 ARTHRITIS: ICD-10-CM

## 2023-07-11 DIAGNOSIS — E66.9 OBESITY (BMI 30-39.9): ICD-10-CM

## 2023-07-20 ENCOUNTER — OFFICE VISIT (OUTPATIENT)
Dept: BARIATRICS/WEIGHT MGMT | Age: 53
End: 2023-07-20
Payer: COMMERCIAL

## 2023-07-20 VITALS
DIASTOLIC BLOOD PRESSURE: 88 MMHG | WEIGHT: 191 LBS | BODY MASS INDEX: 30.7 KG/M2 | HEART RATE: 100 BPM | SYSTOLIC BLOOD PRESSURE: 122 MMHG | HEIGHT: 66 IN | OXYGEN SATURATION: 97 %

## 2023-07-20 DIAGNOSIS — Z98.84 STATUS POST GASTRIC BYPASS FOR OBESITY: ICD-10-CM

## 2023-07-20 DIAGNOSIS — K43.2 INCISIONAL HERNIA, WITHOUT OBSTRUCTION OR GANGRENE: ICD-10-CM

## 2023-07-20 DIAGNOSIS — E66.9 OBESITY (BMI 30-39.9): Primary | ICD-10-CM

## 2023-07-20 PROCEDURE — 99213 OFFICE O/P EST LOW 20 MIN: CPT | Performed by: SURGERY

## 2023-08-02 ENCOUNTER — HOSPITAL ENCOUNTER (OUTPATIENT)
Dept: CT IMAGING | Facility: CLINIC | Age: 53
Discharge: HOME | End: 2023-08-04
Payer: COMMERCIAL

## 2023-08-02 DIAGNOSIS — K43.2 INCISIONAL HERNIA, WITHOUT OBSTRUCTION OR GANGRENE: ICD-10-CM

## 2023-08-02 PROCEDURE — 2580000003 HC RX 258: Performed by: SURGERY

## 2023-08-02 PROCEDURE — 6360000004 HC RX CONTRAST MEDICATION: Performed by: SURGERY

## 2023-08-02 PROCEDURE — 74177 CT ABD & PELVIS W/CONTRAST: CPT

## 2023-08-02 PROCEDURE — 2500000003 HC RX 250 WO HCPCS: Performed by: SURGERY

## 2023-08-02 RX ORDER — SODIUM CHLORIDE 0.9 % (FLUSH) 0.9 %
10 SYRINGE (ML) INJECTION PRN
Status: DISCONTINUED | OUTPATIENT
Start: 2023-08-02 | End: 2023-08-05 | Stop reason: HOSPADM

## 2023-08-02 RX ORDER — 0.9 % SODIUM CHLORIDE 0.9 %
80 INTRAVENOUS SOLUTION INTRAVENOUS ONCE
Status: COMPLETED | OUTPATIENT
Start: 2023-08-02 | End: 2023-08-02

## 2023-08-02 RX ADMIN — IOPAMIDOL 75 ML: 755 INJECTION, SOLUTION INTRAVENOUS at 14:19

## 2023-08-02 RX ADMIN — BARIUM SULFATE 450 ML: 20 SUSPENSION ORAL at 14:19

## 2023-08-02 RX ADMIN — SODIUM CHLORIDE 75 ML: 9 INJECTION, SOLUTION INTRAVENOUS at 14:20

## 2023-08-02 RX ADMIN — SODIUM CHLORIDE, PRESERVATIVE FREE 10 ML: 5 INJECTION INTRAVENOUS at 14:19

## 2023-08-07 ENCOUNTER — TELEPHONE (OUTPATIENT)
Dept: BARIATRICS/WEIGHT MGMT | Age: 53
End: 2023-08-07

## 2023-08-09 ENCOUNTER — OFFICE VISIT (OUTPATIENT)
Dept: BARIATRICS/WEIGHT MGMT | Age: 53
End: 2023-08-09
Payer: COMMERCIAL

## 2023-08-09 VITALS
BODY MASS INDEX: 30.37 KG/M2 | DIASTOLIC BLOOD PRESSURE: 82 MMHG | SYSTOLIC BLOOD PRESSURE: 118 MMHG | WEIGHT: 189 LBS | HEIGHT: 66 IN | OXYGEN SATURATION: 98 % | HEART RATE: 72 BPM

## 2023-08-09 DIAGNOSIS — E66.9 OBESITY (BMI 30-39.9): ICD-10-CM

## 2023-08-09 DIAGNOSIS — K43.2 INCISIONAL HERNIA, WITHOUT OBSTRUCTION OR GANGRENE: Primary | ICD-10-CM

## 2023-08-09 DIAGNOSIS — Z98.84 S/P GASTRIC BYPASS: ICD-10-CM

## 2023-08-09 PROCEDURE — 99213 OFFICE O/P EST LOW 20 MIN: CPT | Performed by: SURGERY

## 2023-09-26 ENCOUNTER — TELEPHONE (OUTPATIENT)
Dept: BARIATRICS/WEIGHT MGMT | Age: 53
End: 2023-09-26

## 2023-09-26 NOTE — TELEPHONE ENCOUNTER
Telephone Encounter: Called patient at request. Has not seen RD since 1 week post-op. 24-hour recall:  Breakfast - skips  AM snack - skips  Lunch/Dinner - chicken with brown rice and broccoli  Dinner - Pepperoni/turkey roll-up + flatbread  Evening snack: Low-sugar greek yogurt    Meets 64 oz fluid goal. Drinks some hot tea and mostly crystal light. Reports does not sleep despite medication from MD.    Based on diet recall, discussed importance of adequate protein, small, frequent meals and adequate sleep for continued weight loss. Has in-office follow up in October.  Encouraged to follow-up with RD also when seeing MD.    Ceci Hope MS, RD, LD  Clinical Dietitian  [facility]  [phone]

## 2023-09-26 NOTE — TELEPHONE ENCOUNTER
She is 10 months out from Bypass. She said she is biking everyday and following diet. She said she is not loosing. Asking if there are any tips and tricks for her. Please call patient.

## 2023-09-27 RX ORDER — PANTOPRAZOLE SODIUM 20 MG/1
TABLET, DELAYED RELEASE ORAL
Qty: 90 TABLET | Refills: 1 | Status: SHIPPED | OUTPATIENT
Start: 2023-09-27

## 2023-09-29 ENCOUNTER — TELEPHONE (OUTPATIENT)
Dept: BARIATRICS/WEIGHT MGMT | Age: 53
End: 2023-09-29

## 2023-09-29 NOTE — TELEPHONE ENCOUNTER
Next Visit Date:  10/19/2023    Patient Surgery Date: 12-13-22    Type of  Surgery: BYPASS    Patient called office states she was told by Domingo Sauer to take Protonix for 6 months after surgery. Patient states medication was sent to pharmacy 9/27/23 by Dr Shannan White patient wanted to make sure if she should still take medication or can she stop? Please advise     Current  Pharmacy: [address] [place] [address], [address] [place] [address]   Phone:  [phone]  Fax:  [phone]    Patient advised: will send telephone encounter to Domingo Sauer to review and call back with answer.     Is it ok to leave a message if we call back: yes

## 2023-09-29 NOTE — TELEPHONE ENCOUNTER
Patient returned call, verbalized instructions as written from [facility].

## 2023-10-04 ENCOUNTER — TELEPHONE (OUTPATIENT)
Dept: BARIATRICS/WEIGHT MGMT | Age: 53
End: 2023-10-04

## 2023-10-04 NOTE — TELEPHONE ENCOUNTER
Patient called office back states she will wait until 10/19 to be seen for this, patient already had 9 mo post op scheduled at this time

## 2023-10-26 ENCOUNTER — OFFICE VISIT (OUTPATIENT)
Dept: BARIATRICS/WEIGHT MGMT | Age: 53
End: 2023-10-26
Payer: COMMERCIAL

## 2023-10-26 VITALS
DIASTOLIC BLOOD PRESSURE: 70 MMHG | HEART RATE: 70 BPM | BODY MASS INDEX: 29.09 KG/M2 | SYSTOLIC BLOOD PRESSURE: 100 MMHG | WEIGHT: 181 LBS | HEIGHT: 66 IN

## 2023-10-26 DIAGNOSIS — K90.89 OTHER SPECIFIED INTESTINAL MALABSORPTION: ICD-10-CM

## 2023-10-26 DIAGNOSIS — K43.2 INCISIONAL HERNIA, WITHOUT OBSTRUCTION OR GANGRENE: Primary | ICD-10-CM

## 2023-10-26 DIAGNOSIS — Z98.84 S/P GASTRIC BYPASS: ICD-10-CM

## 2023-10-26 DIAGNOSIS — E66.9 OBESITY (BMI 30-39.9): ICD-10-CM

## 2023-10-26 DIAGNOSIS — Z98.84 S/P GASTRIC BYPASS: Primary | ICD-10-CM

## 2023-10-26 DIAGNOSIS — D50.9 IRON DEFICIENCY ANEMIA, UNSPECIFIED IRON DEFICIENCY ANEMIA TYPE: ICD-10-CM

## 2023-10-26 DIAGNOSIS — E03.9 ACQUIRED HYPOTHYROIDISM: ICD-10-CM

## 2023-10-26 PROCEDURE — 99214 OFFICE O/P EST MOD 30 MIN: CPT | Performed by: SURGERY

## 2023-10-29 ENCOUNTER — ANESTHESIA EVENT (OUTPATIENT)
Dept: OPERATING ROOM | Age: 53
End: 2023-10-29
Payer: COMMERCIAL

## 2023-10-31 ENCOUNTER — HOSPITAL ENCOUNTER (OUTPATIENT)
Age: 53
Setting detail: OUTPATIENT SURGERY
Discharge: HOME | End: 2023-10-31
Attending: SURGERY | Admitting: SURGERY
Payer: COMMERCIAL

## 2023-10-31 ENCOUNTER — ANESTHESIA (OUTPATIENT)
Dept: OPERATING ROOM | Age: 53
End: 2023-10-31
Payer: COMMERCIAL

## 2023-10-31 VITALS
OXYGEN SATURATION: 95 % | HEIGHT: 66 IN | RESPIRATION RATE: 16 BRPM | TEMPERATURE: 97 F | DIASTOLIC BLOOD PRESSURE: 84 MMHG | BODY MASS INDEX: 29.05 KG/M2 | HEART RATE: 86 BPM | WEIGHT: 180.78 LBS | SYSTOLIC BLOOD PRESSURE: 127 MMHG

## 2023-10-31 DIAGNOSIS — R10.9 ABDOMINAL PAIN, UNSPECIFIED ABDOMINAL LOCATION: ICD-10-CM

## 2023-10-31 DIAGNOSIS — K43.2 INCISIONAL HERNIA, WITHOUT OBSTRUCTION OR GANGRENE: Primary | ICD-10-CM

## 2023-10-31 LAB
ANION GAP SERPL CALCULATED.3IONS-SCNC: 8 MMOL/L (ref 9–17)
BUN SERPL-MCNC: 16 MG/DL (ref 6–20)
CALCIUM SERPL-MCNC: 9.7 MG/DL (ref 8.6–10.4)
CHLORIDE SERPL-SCNC: 103 MMOL/L (ref 98–107)
CO2 SERPL-SCNC: 23 MMOL/L (ref 20–31)
CREAT SERPL-MCNC: 0.6 MG/DL (ref 0.5–0.9)
ERYTHROCYTE [DISTWIDTH] IN BLOOD BY AUTOMATED COUNT: 13.3 % (ref 11.8–14.4)
GFR SERPL CREATININE-BSD FRML MDRD: >60 ML/MIN/1.73M2
GLUCOSE SERPL-MCNC: 117 MG/DL (ref 70–99)
HCT VFR BLD AUTO: 39.9 % (ref 36.3–47.1)
HGB BLD-MCNC: 13.1 G/DL (ref 11.9–15.1)
MCH RBC QN AUTO: 31.3 PG (ref 25.2–33.5)
MCHC RBC AUTO-ENTMCNC: 32.8 G/DL (ref 28.4–34.8)
MCV RBC AUTO: 95.2 FL (ref 82.6–102.9)
NRBC BLD-RTO: 0 PER 100 WBC
PLATELET # BLD AUTO: 290 K/UL (ref 138–453)
PMV BLD AUTO: 9.6 FL (ref 8.1–13.5)
POC INR: 0.9
POTASSIUM SERPL-SCNC: 4.4 MMOL/L (ref 3.7–5.3)
PROTHROMBIN TIME, POC: 11.4 SEC (ref 10.4–14.2)
RBC # BLD AUTO: 4.19 M/UL (ref 3.95–5.11)
SODIUM SERPL-SCNC: 134 MMOL/L (ref 135–144)
WBC OTHER # BLD: 6.9 K/UL (ref 3.5–11.3)

## 2023-10-31 PROCEDURE — S2900 ROBOTIC SURGICAL SYSTEM: HCPCS | Performed by: SURGERY

## 2023-10-31 PROCEDURE — 6370000000 HC RX 637 (ALT 250 FOR IP): Performed by: ANESTHESIOLOGY

## 2023-10-31 PROCEDURE — 88302 TISSUE EXAM BY PATHOLOGIST: CPT

## 2023-10-31 PROCEDURE — 3600000009 HC SURGERY ROBOT BASE: Performed by: SURGERY

## 2023-10-31 PROCEDURE — 6360000002 HC RX W HCPCS

## 2023-10-31 PROCEDURE — 2580000003 HC RX 258: Performed by: SURGERY

## 2023-10-31 PROCEDURE — 3700000001 HC ADD 15 MINUTES (ANESTHESIA): Performed by: SURGERY

## 2023-10-31 PROCEDURE — 6360000002 HC RX W HCPCS: Performed by: SURGERY

## 2023-10-31 PROCEDURE — 2709999900 HC NON-CHARGEABLE SUPPLY: Performed by: SURGERY

## 2023-10-31 PROCEDURE — 7100000000 HC PACU RECOVERY - FIRST 15 MIN: Performed by: SURGERY

## 2023-10-31 PROCEDURE — C1781 MESH (IMPLANTABLE): HCPCS | Performed by: SURGERY

## 2023-10-31 PROCEDURE — 6360000002 HC RX W HCPCS: Performed by: ANESTHESIOLOGY

## 2023-10-31 PROCEDURE — 3700000000 HC ANESTHESIA ATTENDED CARE: Performed by: SURGERY

## 2023-10-31 PROCEDURE — 85610 PROTHROMBIN TIME: CPT

## 2023-10-31 PROCEDURE — 3600000019 HC SURGERY ROBOT ADDTL 15MIN: Performed by: SURGERY

## 2023-10-31 PROCEDURE — 80048 BASIC METABOLIC PNL TOTAL CA: CPT

## 2023-10-31 PROCEDURE — 7100000011 HC PHASE II RECOVERY - ADDTL 15 MIN: Performed by: SURGERY

## 2023-10-31 PROCEDURE — 85027 COMPLETE CBC AUTOMATED: CPT

## 2023-10-31 PROCEDURE — 2500000003 HC RX 250 WO HCPCS

## 2023-10-31 PROCEDURE — 7100000010 HC PHASE II RECOVERY - FIRST 15 MIN: Performed by: SURGERY

## 2023-10-31 PROCEDURE — 7100000001 HC PACU RECOVERY - ADDTL 15 MIN: Performed by: SURGERY

## 2023-10-31 DEVICE — MESH SURG DIA6IN UNCOATED M WT MFIL PROPYLENE CIR HYDRGEL: Type: IMPLANTABLE DEVICE | Status: FUNCTIONAL

## 2023-10-31 RX ORDER — SODIUM CHLORIDE 0.9 % (FLUSH) 0.9 %
5-40 SYRINGE (ML) INJECTION PRN
Status: DISCONTINUED | OUTPATIENT
Start: 2023-10-31 | End: 2023-10-31 | Stop reason: HOSPADM

## 2023-10-31 RX ORDER — ONDANSETRON 2 MG/ML
4 INJECTION INTRAMUSCULAR; INTRAVENOUS
Status: DISCONTINUED | OUTPATIENT
Start: 2023-10-31 | End: 2023-10-31 | Stop reason: HOSPADM

## 2023-10-31 RX ORDER — LIDOCAINE HYDROCHLORIDE 10 MG/ML
INJECTION, SOLUTION EPIDURAL; INFILTRATION; INTRACAUDAL; PERINEURAL PRN
Status: DISCONTINUED | OUTPATIENT
Start: 2023-10-31 | End: 2023-10-31 | Stop reason: SDUPTHER

## 2023-10-31 RX ORDER — SODIUM CHLORIDE 9 MG/ML
INJECTION, SOLUTION INTRAVENOUS PRN
Status: DISCONTINUED | OUTPATIENT
Start: 2023-10-31 | End: 2023-10-31 | Stop reason: HOSPADM

## 2023-10-31 RX ORDER — CYCLOBENZAPRINE HCL 10 MG
10 TABLET ORAL 3 TIMES DAILY PRN
Qty: 21 TABLET | Refills: 0 | Status: SHIPPED | OUTPATIENT
Start: 2023-10-31 | End: 2023-11-10

## 2023-10-31 RX ORDER — SODIUM CHLORIDE 0.9 % (FLUSH) 0.9 %
5-40 SYRINGE (ML) INJECTION EVERY 12 HOURS SCHEDULED
Status: DISCONTINUED | OUTPATIENT
Start: 2023-10-31 | End: 2023-10-31 | Stop reason: HOSPADM

## 2023-10-31 RX ORDER — SODIUM CHLORIDE, SODIUM LACTATE, POTASSIUM CHLORIDE, CALCIUM CHLORIDE 600; 310; 30; 20 MG/100ML; MG/100ML; MG/100ML; MG/100ML
INJECTION, SOLUTION INTRAVENOUS CONTINUOUS
Status: DISCONTINUED | OUTPATIENT
Start: 2023-10-31 | End: 2023-10-31 | Stop reason: HOSPADM

## 2023-10-31 RX ORDER — HYDRALAZINE HYDROCHLORIDE 20 MG/ML
10 INJECTION INTRAMUSCULAR; INTRAVENOUS
Status: DISCONTINUED | OUTPATIENT
Start: 2023-10-31 | End: 2023-10-31 | Stop reason: HOSPADM

## 2023-10-31 RX ORDER — DEXAMETHASONE SODIUM PHOSPHATE 10 MG/ML
INJECTION INTRAMUSCULAR; INTRAVENOUS PRN
Status: DISCONTINUED | OUTPATIENT
Start: 2023-10-31 | End: 2023-10-31 | Stop reason: SDUPTHER

## 2023-10-31 RX ORDER — ROCURONIUM BROMIDE 10 MG/ML
INJECTION, SOLUTION INTRAVENOUS PRN
Status: DISCONTINUED | OUTPATIENT
Start: 2023-10-31 | End: 2023-10-31 | Stop reason: SDUPTHER

## 2023-10-31 RX ORDER — BUPIVACAINE HYDROCHLORIDE 5 MG/ML
INJECTION, SOLUTION PERINEURAL PRN
Status: DISCONTINUED | OUTPATIENT
Start: 2023-10-31 | End: 2023-10-31 | Stop reason: ALTCHOICE

## 2023-10-31 RX ORDER — ONDANSETRON 2 MG/ML
INJECTION INTRAMUSCULAR; INTRAVENOUS PRN
Status: DISCONTINUED | OUTPATIENT
Start: 2023-10-31 | End: 2023-10-31 | Stop reason: SDUPTHER

## 2023-10-31 RX ORDER — MAGNESIUM HYDROXIDE 1200 MG/15ML
LIQUID ORAL CONTINUOUS PRN
Status: COMPLETED | OUTPATIENT
Start: 2023-10-31 | End: 2023-10-31

## 2023-10-31 RX ORDER — MIDAZOLAM HYDROCHLORIDE 1 MG/ML
INJECTION INTRAMUSCULAR; INTRAVENOUS PRN
Status: DISCONTINUED | OUTPATIENT
Start: 2023-10-31 | End: 2023-10-31 | Stop reason: SDUPTHER

## 2023-10-31 RX ORDER — HEPARIN SODIUM 5000 [USP'U]/ML
5000 INJECTION, SOLUTION INTRAVENOUS; SUBCUTANEOUS ONCE
Status: COMPLETED | OUTPATIENT
Start: 2023-10-31 | End: 2023-10-31

## 2023-10-31 RX ORDER — FENTANYL CITRATE 50 UG/ML
INJECTION, SOLUTION INTRAMUSCULAR; INTRAVENOUS PRN
Status: DISCONTINUED | OUTPATIENT
Start: 2023-10-31 | End: 2023-10-31 | Stop reason: SDUPTHER

## 2023-10-31 RX ORDER — LABETALOL HYDROCHLORIDE 5 MG/ML
10 INJECTION, SOLUTION INTRAVENOUS
Status: DISCONTINUED | OUTPATIENT
Start: 2023-10-31 | End: 2023-10-31 | Stop reason: HOSPADM

## 2023-10-31 RX ORDER — KETOROLAC TROMETHAMINE 30 MG/ML
INJECTION, SOLUTION INTRAMUSCULAR; INTRAVENOUS PRN
Status: DISCONTINUED | OUTPATIENT
Start: 2023-10-31 | End: 2023-10-31 | Stop reason: SDUPTHER

## 2023-10-31 RX ORDER — OXYCODONE HYDROCHLORIDE AND ACETAMINOPHEN 5; 325 MG/1; MG/1
1 TABLET ORAL ONCE
Status: COMPLETED | OUTPATIENT
Start: 2023-10-31 | End: 2023-10-31

## 2023-10-31 RX ORDER — PROPOFOL 10 MG/ML
INJECTION, EMULSION INTRAVENOUS PRN
Status: DISCONTINUED | OUTPATIENT
Start: 2023-10-31 | End: 2023-10-31 | Stop reason: SDUPTHER

## 2023-10-31 RX ORDER — PHENYLEPHRINE HCL IN 0.9% NACL 1 MG/10 ML
SYRINGE (ML) INTRAVENOUS PRN
Status: DISCONTINUED | OUTPATIENT
Start: 2023-10-31 | End: 2023-10-31 | Stop reason: SDUPTHER

## 2023-10-31 RX ORDER — OXYCODONE HYDROCHLORIDE AND ACETAMINOPHEN 5; 325 MG/1; MG/1
1 TABLET ORAL EVERY 6 HOURS PRN
Qty: 28 TABLET | Refills: 0 | Status: SHIPPED | OUTPATIENT
Start: 2023-10-31 | End: 2023-11-07

## 2023-10-31 RX ORDER — PROMETHAZINE HYDROCHLORIDE 25 MG/1
25 TABLET ORAL EVERY 6 HOURS PRN
Qty: 30 TABLET | Refills: 0 | Status: SHIPPED | OUTPATIENT
Start: 2023-10-31

## 2023-10-31 RX ADMIN — ROCURONIUM BROMIDE 20 MG: 10 INJECTION, SOLUTION INTRAVENOUS at 13:12

## 2023-10-31 RX ADMIN — SODIUM CHLORIDE, POTASSIUM CHLORIDE, SODIUM LACTATE AND CALCIUM CHLORIDE: 600; 310; 30; 20 INJECTION, SOLUTION INTRAVENOUS at 08:48

## 2023-10-31 RX ADMIN — FENTANYL CITRATE 50 MCG: 50 INJECTION, SOLUTION INTRAMUSCULAR; INTRAVENOUS at 14:05

## 2023-10-31 RX ADMIN — FENTANYL CITRATE 25 MCG: 50 INJECTION, SOLUTION INTRAMUSCULAR; INTRAVENOUS at 12:07

## 2023-10-31 RX ADMIN — FENTANYL CITRATE 50 MCG: 50 INJECTION, SOLUTION INTRAMUSCULAR; INTRAVENOUS at 12:34

## 2023-10-31 RX ADMIN — SODIUM CHLORIDE, POTASSIUM CHLORIDE, SODIUM LACTATE AND CALCIUM CHLORIDE: 600; 310; 30; 20 INJECTION, SOLUTION INTRAVENOUS at 11:47

## 2023-10-31 RX ADMIN — ROCURONIUM BROMIDE 20 MG: 10 INJECTION, SOLUTION INTRAVENOUS at 12:34

## 2023-10-31 RX ADMIN — OXYCODONE AND ACETAMINOPHEN 1 TABLET: 5; 325 TABLET ORAL at 16:26

## 2023-10-31 RX ADMIN — HYDROMORPHONE HYDROCHLORIDE 0.5 MG: 1 INJECTION, SOLUTION INTRAMUSCULAR; INTRAVENOUS; SUBCUTANEOUS at 15:39

## 2023-10-31 RX ADMIN — PROPOFOL 50 MG: 10 INJECTION, EMULSION INTRAVENOUS at 11:27

## 2023-10-31 RX ADMIN — FENTANYL CITRATE 50 MCG: 50 INJECTION, SOLUTION INTRAMUSCULAR; INTRAVENOUS at 14:38

## 2023-10-31 RX ADMIN — LIDOCAINE HYDROCHLORIDE 50 MG: 10 INJECTION, SOLUTION EPIDURAL; INFILTRATION; INTRACAUDAL; PERINEURAL at 11:17

## 2023-10-31 RX ADMIN — FENTANYL CITRATE 25 MCG: 50 INJECTION, SOLUTION INTRAMUSCULAR; INTRAVENOUS at 13:36

## 2023-10-31 RX ADMIN — SUGAMMADEX 200 MG: 100 INJECTION, SOLUTION INTRAVENOUS at 14:24

## 2023-10-31 RX ADMIN — PROPOFOL 200 MG: 10 INJECTION, EMULSION INTRAVENOUS at 11:17

## 2023-10-31 RX ADMIN — ROCURONIUM BROMIDE 20 MG: 10 INJECTION, SOLUTION INTRAVENOUS at 11:54

## 2023-10-31 RX ADMIN — FENTANYL CITRATE 25 MCG: 50 INJECTION, SOLUTION INTRAMUSCULAR; INTRAVENOUS at 13:14

## 2023-10-31 RX ADMIN — ROCURONIUM BROMIDE 50 MG: 10 INJECTION, SOLUTION INTRAVENOUS at 11:17

## 2023-10-31 RX ADMIN — HEPARIN SODIUM 5000 UNITS: 5000 INJECTION INTRAVENOUS; SUBCUTANEOUS at 08:53

## 2023-10-31 RX ADMIN — Medication 100 MCG: at 11:40

## 2023-10-31 RX ADMIN — Medication 100 MCG: at 11:52

## 2023-10-31 RX ADMIN — FENTANYL CITRATE 50 MCG: 50 INJECTION, SOLUTION INTRAMUSCULAR; INTRAVENOUS at 11:17

## 2023-10-31 RX ADMIN — ROCURONIUM BROMIDE 10 MG: 10 INJECTION, SOLUTION INTRAVENOUS at 12:11

## 2023-10-31 RX ADMIN — DEXAMETHASONE SODIUM PHOSPHATE 10 MG: 10 INJECTION INTRAMUSCULAR; INTRAVENOUS at 11:33

## 2023-10-31 RX ADMIN — Medication 2000 MG: at 11:41

## 2023-10-31 RX ADMIN — MIDAZOLAM 2 MG: 1 INJECTION INTRAMUSCULAR; INTRAVENOUS at 11:15

## 2023-10-31 RX ADMIN — KETOROLAC TROMETHAMINE 30 MG: 30 INJECTION, SOLUTION INTRAMUSCULAR at 14:15

## 2023-10-31 RX ADMIN — HYDROMORPHONE HYDROCHLORIDE 0.5 MG: 1 INJECTION, SOLUTION INTRAMUSCULAR; INTRAVENOUS; SUBCUTANEOUS at 15:02

## 2023-10-31 RX ADMIN — ONDANSETRON 4 MG: 2 INJECTION INTRAMUSCULAR; INTRAVENOUS at 14:08

## 2023-10-31 RX ADMIN — FENTANYL CITRATE 25 MCG: 50 INJECTION, SOLUTION INTRAMUSCULAR; INTRAVENOUS at 12:02

## 2023-10-31 RX ADMIN — ROCURONIUM BROMIDE 10 MG: 10 INJECTION, SOLUTION INTRAVENOUS at 13:48

## 2023-10-31 ASSESSMENT — PAIN DESCRIPTION - LOCATION
LOCATION: ABDOMEN

## 2023-10-31 ASSESSMENT — PAIN DESCRIPTION - PAIN TYPE: TYPE: SURGICAL PAIN

## 2023-10-31 ASSESSMENT — PAIN DESCRIPTION - DESCRIPTORS
DESCRIPTORS: SHARP;STABBING

## 2023-10-31 ASSESSMENT — LIFESTYLE VARIABLES: SMOKING_STATUS: 1

## 2023-10-31 ASSESSMENT — PAIN SCALES - GENERAL
PAINLEVEL_OUTOF10: 8
PAINLEVEL_OUTOF10: 0

## 2023-10-31 ASSESSMENT — PAIN DESCRIPTION - ONSET: ONSET: ON-GOING

## 2023-10-31 ASSESSMENT — PAIN - FUNCTIONAL ASSESSMENT: PAIN_FUNCTIONAL_ASSESSMENT: PREVENTS OR INTERFERES SOME ACTIVE ACTIVITIES AND ADLS

## 2023-10-31 NOTE — BRIEF OP NOTE
Brief Postoperative Note      Patient: Meghana Baron  YOB: 1970  MRN: [id number]    Date of Procedure: 10/31/2023    Pre-Op Diagnosis Codes:     * Incisional hernia, without obstruction or gangrene [K43.2]     * Abdominal pain, unspecified abdominal location [R10.9]    Post-Op Diagnosis: Same       Procedure(s):  XI ROBOTIC LAPAROSCOPIC INCISIONAL HERNIA REPAIR,  MESH, DIAGNOSTIC LAPAROSCOPY    Surgeon(s):  Willow Adhikari DO    Assistant:  First Assistant: Christina Ureña RN    Anesthesia: General    Estimated Blood Loss (mL): Minimal    Complications: None    Specimens:   ID Type Source Tests Collected by Time Destination   A : [facility] Tissue Hernia Sac SURGICAL PATHOLOGY Willow Adhikari DO 10/31/2023 1254        Implants:  Implant Name Type Inv.  Item Serial No.  Lot No. LRB No. Used Action   [facility]     [id number] N/A 1 Implanted         Drains:   [REMOVED] Urinary Catheter 10/31/23 Marsh (Removed)       Findings: see note      Electronically signed by Willow Adhikari DO on 10/31/2023 at 3:08 PM

## 2023-10-31 NOTE — OP NOTE
transferred to PACU in stable condition.     Electronically signed by Bertha Bassett DO on 10/31/2023 at 3:07 PM

## 2023-10-31 NOTE — H&P
History and Physical    Pt Name: Mustapha Cruz  MRN: [id number]  YOB: 1970  Date of evaluation: 10/31/2023  Primary Care Physician: Joann Henderson PA-C    SUBJECTIVE:   History of Chief Complaint:    Mustapha Cruz is a 47 y.o. female who is scheduled today for  [address],  MESH, DIAGNOSTIC LAPAROSCOPY, POSSIBLE OPEN. Patient reports she has had an abdominal hernia, noted about 6-7 months ago. She states the hernia is nonreducible and irritates her at times but is nonpainful. She states the hernia has grown over time and causes her intermittent nausea. Patient had a gastric sleeve completed 12/2022. Allergies  has No Known Allergies. Medications  Prior to Admission medications    Medication Sig Start Date End Date Taking? Authorizing Provider   pantoprazole (PROTONIX) 20 MG tablet TAKE 1 TABLET BY MOUTH EVERY DAY  Patient not taking: Reported on 10/31/2023 9/27/23   Guero Antonio DO   promethazine (PHENERGAN) 25 MG tablet Take 1 tablet by mouth every 6 hours as needed for Nausea  Patient not taking: Reported on 7/20/2023 12/14/22   Guero Antonio DO   lithium 300 MG capsule Take 1 capsule by mouth Daily    ProviderSonia MD   eszopiclone (LUNESTA) 3 MG TABS Take 1 tablet by mouth nightly.  10/19/22   Sonia Keyes MD   ARIPiprazole (ABILIFY) 5 MG tablet Take 2 tablets by mouth daily 2/4/22   Sonia Keyes MD   buPROPion [facility] SR) 100 MG extended release tablet Take 3 tablets by mouth 2 times daily 150 morning 150 at night 2/17/22 8/9/23  Sonia Keyes MD   etonogestrel-ethinyl estradiol (Lovella Graves) 0.12-0.015 MG/24HR vaginal ring INSERT 1 RING VAGINALLY EVERY 4 WEEKS, LEAVE IN PLACE 3 WEEKS, THEN REMOVE  Patient not taking: Reported on 7/20/2023 1/5/22   Sonia Keyes MD   levothyroxine (SYNTHROID) 88 MCG tablet Take 1 tablet by mouth Daily M-W-F-two tablets   T-TH-S-S -one tablet 3/3/22   Sonia Keyes MD

## 2023-10-31 NOTE — PROGRESS NOTES
Patient discharged to home with spouse and her children. Spouse Deisi Albright, has already picked up her 3 prescriptions at the [address]

## 2023-10-31 NOTE — DISCHARGE INSTRUCTIONS
Discharge Instructions for Bariatric Surgery     You had a Robotic Incisional Hernia repair    What You Will Need   Abdominal Binder  Incentive spirometer (a breathing device)       Home Care     It is important to keep the incisions clean and dry to promote healing. Shower with soap and rinse and dry thoroughly. If incisions are oozing, you may cover with clean gauze. Use the incentive spirometer every couple of hours. This is to make sure you are breathing deeply and keeping the air sacs within your lungs as open as possible to prevent respiratory problems. Diet    Regular     Physical Activity    When home, we recommend that you take frequent walks, as tolerated, to prevent complications and increase your endurance. Ask your doctor when you will be able to return to work. You may need to wait 2-6 weeks. Do not drive unless you are no longer using pain medications  Do not lift anything over ten pounds for 4 weeks. Medications    Regular Bariatric    Lifestyle Changes    Changing your diet and level of activity are the biggest lifestyle changes associated with your success. Be aware that you may have emotional ups and downs after this surgery. Follow-up   Follow up with your primary care physician in one week. Follow up with Dr. Jose Hendrix in 1 week. If you don't already have a scheduled  appointment, please call the office at [phone].     Call Your Doctor If Any of the Following Occurs   Monitor your recovery once you leave the hospital. If any of the following occur, call your doctor:   Signs of infection, including fever above 100.5F    Redness, swelling, increasing pain, excessive bleeding, or any discharge from the incision site   Persistent nausea and/or vomiting   Pain that you can't control with the medications you've been given   Shortness of breath and/or chest pain   Tachycardia (racing heart sensation) unrelieved by rest  Pain, redness and/or swelling in your feet or legs

## 2023-10-31 NOTE — PROGRESS NOTES
Discharged instructions reviewed with patient and spouse. All questions answered and both verbalize understanding.

## 2023-11-01 ENCOUNTER — TELEPHONE (OUTPATIENT)
Dept: BARIATRICS/WEIGHT MGMT | Age: 53
End: 2023-11-01

## 2023-11-01 NOTE — TELEPHONE ENCOUNTER
Next Visit Date:  11/10/2023    Patient Surgery Date  10/31/23    Type of  Surgery  incisional hernia repair    Patient calls complaining of  feeling as if stomach is on \"fire\" and in severe pain    Onset: 1 day(s) ago  Timing: constant, intermittent  Severity: Severe    Progression: increased    Associated Symptoms: pain    Any allergy  To medications     none    Current  Pharmacy   [facility]    Patient advised:   If  Symptoms worsen seek treatment at  Emergency Room. You will receive a call back from the office within 24-48 hours.     Is it ok to leave a message if we call back yes

## 2023-11-02 LAB — SURGICAL PATHOLOGY REPORT: NORMAL

## 2023-11-03 ENCOUNTER — TELEPHONE (OUTPATIENT)
Dept: BARIATRICS/WEIGHT MGMT | Age: 53
End: 2023-11-03

## 2023-11-03 RX ORDER — LIDOCAINE 4 G/G
1 PATCH TOPICAL DAILY
Qty: 30 PATCH | Refills: 0 | Status: SHIPPED | OUTPATIENT
Start: 2023-11-03 | End: 2023-12-03

## 2023-11-03 NOTE — TELEPHONE ENCOUNTER
Next Visit Date:  11/10/2023    Patient Surgery Date  10/31/23  Type of  Surgery  Incisional Hernia repair     Patient calls complaining of  still complaining of pain - has \"2\" percocet left - has been taking \"2\" when needed states she has 2 tablets left. Onset: 5 day(s) ago  Timing: constant, intermittent  Severity: Moderate    Progression: stable    Associated Symptoms: incisional pain - has \"2\" percocet left - has been taking 2 at a time per Dr. Real Conley ok    Any allergy  To medications     No allergies    [facility]    Patient advised:   If  Symptoms worsen seek treatment at  Emergency Room. You will receive a call back from the office within 24-48 hours.     Is it ok to leave a message if we call back yes

## 2023-11-10 ENCOUNTER — OFFICE VISIT (OUTPATIENT)
Dept: BARIATRICS/WEIGHT MGMT | Age: 53
End: 2023-11-10
Payer: COMMERCIAL

## 2023-11-10 VITALS
HEART RATE: 88 BPM | DIASTOLIC BLOOD PRESSURE: 68 MMHG | WEIGHT: 181 LBS | HEIGHT: 66 IN | SYSTOLIC BLOOD PRESSURE: 118 MMHG | BODY MASS INDEX: 29.09 KG/M2 | OXYGEN SATURATION: 98 %

## 2023-11-10 DIAGNOSIS — Z87.19 HISTORY OF INCISIONAL HERNIA REPAIR: Primary | ICD-10-CM

## 2023-11-10 DIAGNOSIS — Z98.890 HISTORY OF INCISIONAL HERNIA REPAIR: Primary | ICD-10-CM

## 2023-11-10 PROCEDURE — 99213 OFFICE O/P EST LOW 20 MIN: CPT | Performed by: SURGERY

## 2023-11-10 NOTE — PROGRESS NOTES
[address] [facility] INVASIVE BARIATRIC SURG  [address] [facility] [place] [address]  [address] [facility]  Dept: [phone]    ROBOTIC AND MINIMALLY INVASIVE SURGERY  PROGRESS NOTE     CC: General Surgery Follow Up    Patient: Byron Roth      Service Date: 11/10/2023  Visit:   1 week    Medical Record #: [id number]  Date of Surgery:   10/32/2023    History: 47 y.o. female who presents today for a post op follow up after incisional hernia repair. Patient reports regular bowel movements. She denies nausea, vomiting, fever, and chills. She states the pain is well controlled.       Pathology:    [] NA    [] No Gross path    []    [x] Discussed w/ pt   [] Referred to GI      Review of Systems:     General  Negative for: [] Weight Change   [x] Fatigue   [x] Fevers & Chills    [] Appetite change [] Other:    Positive for: [x] Weight Change   [] Fatigue   [] Fevers & Chills    [] Appetite change [] Other:   Cardiac  Negative for: [x] Chest Pain   [] Difficulty Breathing   [] Leg Cramps [x] Edema of Lower Extremeties    [] Left   [] Right      Positive for: [] Chest Pain   [] Difficulty Breathing   [] Leg Cramps [] Edema of Lower Extremeties    [] Left   [] Right   Pulmonary  Negative for: [x] Shortness of Breath [] Wheeze [x] Cough  [x] Calf Pain     Positive for: [] Shortness of Breath [] Wheeze [] Cough  [] Calf Pain   Gastro-Intestinal Negative for: [] Heartburn   [] Reflux   [] Dysphagia   [] Melena   [] BRBPR  [x] Vomiting   [x] Abdominal Pain   [] Diarrhea     [] Constipation  [] Other:     Positive for: [] Heartburn   [] Reflux   [] Dysphagia   [] Melena   [] BRBPR  [] Vomiting   [] Abdominal Pain   [] Diarrhea     [] Constipation  [] Other:    Muskuloskeletal Negative for: [] Joint pain   [] Back pain   [] Knee Pain   [] Muscle weakness [x] Hernia   [x] Edema [] Other:     Positive for: [] Joint pain   [] Back pain   [] Knee Pain   [] Muscle weakness [] Hernia   [] Edema [] Other:

## 2023-12-19 PROBLEM — E66.01 MORBID OBESITY DUE TO EXCESS CALORIES (HCC): Status: RESOLVED | Noted: 2022-12-27 | Resolved: 2023-12-19

## 2023-12-19 PROBLEM — E66.813 OBESITY, CLASS III, BMI 40-49.9 (MORBID OBESITY) (HCC): Status: RESOLVED | Noted: 2022-08-11 | Resolved: 2023-12-19

## 2023-12-19 PROBLEM — E66.9 OBESITY (BMI 30-39.9): Status: RESOLVED | Noted: 2022-07-08 | Resolved: 2023-12-19

## 2023-12-19 PROBLEM — G47.33 OSA (OBSTRUCTIVE SLEEP APNEA): Status: RESOLVED | Noted: 2022-07-08 | Resolved: 2023-12-19

## 2023-12-19 PROBLEM — E66.01 OBESITY, CLASS III, BMI 40-49.9 (MORBID OBESITY) (HCC): Status: RESOLVED | Noted: 2022-08-11 | Resolved: 2023-12-19

## 2024-01-23 PROBLEM — K43.2 INCISIONAL HERNIA, WITHOUT OBSTRUCTION OR GANGRENE: Status: ACTIVE | Noted: 2024-01-23

## 2024-02-19 ENCOUNTER — OFFICE VISIT (OUTPATIENT)
Dept: BARIATRICS/WEIGHT MGMT | Age: 54
End: 2024-02-19
Payer: COMMERCIAL

## 2024-02-19 VITALS
HEART RATE: 66 BPM | HEIGHT: 66 IN | DIASTOLIC BLOOD PRESSURE: 60 MMHG | BODY MASS INDEX: 28.61 KG/M2 | WEIGHT: 178 LBS | SYSTOLIC BLOOD PRESSURE: 110 MMHG | OXYGEN SATURATION: 98 %

## 2024-02-19 DIAGNOSIS — K43.2 INCISIONAL HERNIA, WITHOUT OBSTRUCTION OR GANGRENE: Primary | ICD-10-CM

## 2024-02-19 DIAGNOSIS — Z87.19 HISTORY OF INCISIONAL HERNIA REPAIR: ICD-10-CM

## 2024-02-19 DIAGNOSIS — Z98.890 HISTORY OF INCISIONAL HERNIA REPAIR: ICD-10-CM

## 2024-02-19 DIAGNOSIS — Z98.84 S/P GASTRIC BYPASS: ICD-10-CM

## 2024-02-19 PROCEDURE — 99214 OFFICE O/P EST MOD 30 MIN: CPT | Performed by: NURSE PRACTITIONER

## 2024-02-19 ASSESSMENT — ENCOUNTER SYMPTOMS
VOMITING: 0
COUGH: 0
ABDOMINAL PAIN: 1
WHEEZING: 0
NAUSEA: 0
SHORTNESS OF BREATH: 0
CHEST TIGHTNESS: 0

## 2024-02-19 NOTE — PROGRESS NOTES
[facility] INVASIVE BARIATRIC SURG  [address] [facility]  [address]  [place] [id number]  Dept: [phone]    ROBOTIC AND MINIMALLY INVASIVE SURGERY  PROGRESS NOTE INITIAL EVALUATION     Patient: Kristen Zavala        Service Date: 2/19/2024      HPI:     Chief Complaint   Patient presents with    Hernia       History: 53 y.o. female who presents today for evaluation of an incision hernia. Patient reports regular bowel movements. She denies nausea, vomiting, fever, and chills.     She recently underwent an incision hernia repair in October. That went well and about 2 weeks ago she developed another one at a different location. This one is above her umbilicus.   She states it is starting to become painful.  Denies any changes in bowel/bladder habits.  It is reducible.     The patient denies  a history of myocardial infarction, deep vein thrombosis, pulmonary embolism, renal failure, hepatic failure, and stroke.    Prior Imaging/Testing:      Patient had a CT of the abdomen and pelvis on 8/2/23 which showed  CT OF THE ABDOMEN AND PELVIS WITH CONTRAST 8/2/2023 1:18 pm     TECHNIQUE:  CT of the abdomen and pelvis was performed with the administration of  intravenous contrast. Multiplanar reformatted images are provided for review.  Automated exposure control, iterative reconstruction, and/or weight based  adjustment of the mA/kV was utilized to reduce the radiation dose to as low  as reasonably achievable.     COMPARISON:  None.     HISTORY:  ORDERING SYSTEM PROVIDED HISTORY: Incisional hernia, without obstruction or  gangrene  TECHNOLOGIST PROVIDED HISTORY:     STAT Creatinine as needed:->Yes  rule out incisional hernia  Reason for Exam: evaluation for incisional hernia, gastric bypass 7 mos ago     FINDINGS:  Lower Chest: Visualized lung fields appear clear.  No pleural effusions.  Heart is normal in size.  No pericardial effusion.     Organs: Unremarkable liver,

## 2024-02-29 ENCOUNTER — HOSPITAL ENCOUNTER (OUTPATIENT)
Dept: CT IMAGING | Age: 54
Discharge: HOME | End: 2024-03-02
Payer: COMMERCIAL

## 2024-02-29 DIAGNOSIS — Z98.890 HISTORY OF INCISIONAL HERNIA REPAIR: ICD-10-CM

## 2024-02-29 DIAGNOSIS — Z98.84 S/P GASTRIC BYPASS: ICD-10-CM

## 2024-02-29 DIAGNOSIS — K43.2 INCISIONAL HERNIA, WITHOUT OBSTRUCTION OR GANGRENE: ICD-10-CM

## 2024-02-29 DIAGNOSIS — Z87.19 HISTORY OF INCISIONAL HERNIA REPAIR: ICD-10-CM

## 2024-02-29 PROCEDURE — 74177 CT ABD & PELVIS W/CONTRAST: CPT

## 2024-02-29 PROCEDURE — 2580000003 HC RX 258: Performed by: NURSE PRACTITIONER

## 2024-02-29 PROCEDURE — 2500000003 HC RX 250 WO HCPCS: Performed by: NURSE PRACTITIONER

## 2024-02-29 PROCEDURE — 6360000004 HC RX CONTRAST MEDICATION: Performed by: NURSE PRACTITIONER

## 2024-02-29 RX ORDER — SODIUM CHLORIDE 0.9 % (FLUSH) 0.9 %
10 SYRINGE (ML) INJECTION PRN
Status: DISCONTINUED | OUTPATIENT
Start: 2024-02-29 | End: 2024-03-03 | Stop reason: HOSPADM

## 2024-02-29 RX ORDER — 0.9 % SODIUM CHLORIDE 0.9 %
80 INTRAVENOUS SOLUTION INTRAVENOUS ONCE
Status: COMPLETED | OUTPATIENT
Start: 2024-02-29 | End: 2024-02-29

## 2024-02-29 RX ADMIN — IOPAMIDOL 75 ML: 755 INJECTION, SOLUTION INTRAVENOUS at 11:24

## 2024-02-29 RX ADMIN — SODIUM CHLORIDE 80 ML: 9 INJECTION, SOLUTION INTRAVENOUS at 11:24

## 2024-02-29 RX ADMIN — SODIUM CHLORIDE, PRESERVATIVE FREE 10 ML: 5 INJECTION INTRAVENOUS at 11:24

## 2024-02-29 RX ADMIN — BARIUM SULFATE 450 ML: 20 SUSPENSION ORAL at 11:23

## 2024-03-06 ENCOUNTER — TELEPHONE (OUTPATIENT)
Dept: BARIATRICS/WEIGHT MGMT | Age: 54
End: 2024-03-06

## 2024-03-07 NOTE — TELEPHONE ENCOUNTER
Brandy -- Please review below -- looks like this was sent to you as \"result note\" patient called office regarding this. Aware patient I will send over to Brandy to review and follow up regarding surgery.     Penny -- Patient called office states site is more irritated and swollen ongoing the last couple of days per pt. Patient wants to know what can she take for pain?     Please advise     Result Care Coordination      Result Notes     JANEE Clement CNP  3/1/2024  1:30 PM EST Back to Top      Please schedule for incisional/umbilical hernia repair. Okay'd by Dr. Mckeon.     Please discuss with him for length of the surgery    JANEE Clement CNP  3/1/2024  1:14 PM EST       Please review     
Dr. Mckeon - how long would you like this case booked for? I am looking at scheduling her on 3/19/24?    Does she need PAT or is same day PAT acceptable?    And do you want it scheduled for XI Robotic incisional hernia repair w/mesh? Possible open?    Per Dr. Mckeon - XI Robotic Incisional Hernia repair w/mesh possible open                                Same day PAT                                1.5 hours    Spoke with patient- she is scheduled for 3/12/24 at 11:30 am to arrive at surgery center at 9:30 am. Park in emergency room parking lot and enter door that says surgical center. Nothing to eat or drink after midnight.  Same day PAT.    Patient verbalized understanding.  
Patient has tried heat, cold, abdominal binder nothing is helping per pt-- patient states sharp pain and swelling. Patient rates pain 8    [facility]   
Patient schedule for 3/12 for surgery  
Please find out what she has already done at home for comfort and if it has helped at all. Heat? Cold? Tylenol? Abdominal binder?  
Spoke with patient regarding scheduling surgery - I offered patient 3/12/24 of 4/3/24/  She states she needs to speak with her  and call me back.  
n/a

## 2024-03-08 NOTE — PROGRESS NOTES
Informed chaplain De La Cruz that patient would like a visit prior to surgery on 3/12/2024, surgery is @ 11:30 and patient will arrive @ 9:30 am.

## 2024-03-10 ENCOUNTER — ANESTHESIA EVENT (OUTPATIENT)
Dept: OPERATING ROOM | Age: 54
End: 2024-03-10
Payer: COMMERCIAL

## 2024-03-12 ENCOUNTER — HOSPITAL ENCOUNTER (OUTPATIENT)
Age: 54
Setting detail: OUTPATIENT SURGERY
Discharge: HOME | End: 2024-03-12
Attending: SURGERY | Admitting: SURGERY
Payer: COMMERCIAL

## 2024-03-12 ENCOUNTER — ANESTHESIA (OUTPATIENT)
Dept: OPERATING ROOM | Age: 54
End: 2024-03-12
Payer: COMMERCIAL

## 2024-03-12 VITALS
HEART RATE: 73 BPM | TEMPERATURE: 97.9 F | SYSTOLIC BLOOD PRESSURE: 135 MMHG | WEIGHT: 175 LBS | DIASTOLIC BLOOD PRESSURE: 85 MMHG | RESPIRATION RATE: 12 BRPM | OXYGEN SATURATION: 93 % | HEIGHT: 65 IN | BODY MASS INDEX: 29.16 KG/M2

## 2024-03-12 DIAGNOSIS — K43.2 INCISIONAL HERNIA, WITHOUT OBSTRUCTION OR GANGRENE: ICD-10-CM

## 2024-03-12 LAB
EKG ATRIAL RATE: 50 BPM
EKG P AXIS: 28 DEGREES
EKG P-R INTERVAL: 196 MS
EKG Q-T INTERVAL: 422 MS
EKG QRS DURATION: 88 MS
EKG QTC CALCULATION (BAZETT): 384 MS
EKG R AXIS: -17 DEGREES
EKG T AXIS: 18 DEGREES
EKG VENTRICULAR RATE: 50 BPM

## 2024-03-12 PROCEDURE — 3600000019 HC SURGERY ROBOT ADDTL 15MIN: Performed by: SURGERY

## 2024-03-12 PROCEDURE — 6360000002 HC RX W HCPCS: Performed by: SURGERY

## 2024-03-12 PROCEDURE — 6360000002 HC RX W HCPCS: Performed by: STUDENT IN AN ORGANIZED HEALTH CARE EDUCATION/TRAINING PROGRAM

## 2024-03-12 PROCEDURE — 7100000010 HC PHASE II RECOVERY - FIRST 15 MIN: Performed by: SURGERY

## 2024-03-12 PROCEDURE — 2580000003 HC RX 258: Performed by: SURGERY

## 2024-03-12 PROCEDURE — 7100000011 HC PHASE II RECOVERY - ADDTL 15 MIN: Performed by: SURGERY

## 2024-03-12 PROCEDURE — 3600000009 HC SURGERY ROBOT BASE: Performed by: SURGERY

## 2024-03-12 PROCEDURE — 7100000000 HC PACU RECOVERY - FIRST 15 MIN: Performed by: SURGERY

## 2024-03-12 PROCEDURE — 93005 ELECTROCARDIOGRAM TRACING: CPT | Performed by: ANESTHESIOLOGY

## 2024-03-12 PROCEDURE — 2500000003 HC RX 250 WO HCPCS: Performed by: STUDENT IN AN ORGANIZED HEALTH CARE EDUCATION/TRAINING PROGRAM

## 2024-03-12 PROCEDURE — 87086 URINE CULTURE/COLONY COUNT: CPT

## 2024-03-12 PROCEDURE — C1781 MESH (IMPLANTABLE): HCPCS | Performed by: SURGERY

## 2024-03-12 PROCEDURE — S2900 ROBOTIC SURGICAL SYSTEM: HCPCS | Performed by: SURGERY

## 2024-03-12 PROCEDURE — 2709999900 HC NON-CHARGEABLE SUPPLY: Performed by: SURGERY

## 2024-03-12 PROCEDURE — 49594 RPR AA HRN 1ST 3-10 NCR/STRN: CPT | Performed by: SURGERY

## 2024-03-12 PROCEDURE — 3700000000 HC ANESTHESIA ATTENDED CARE: Performed by: SURGERY

## 2024-03-12 PROCEDURE — 3700000001 HC ADD 15 MINUTES (ANESTHESIA): Performed by: SURGERY

## 2024-03-12 PROCEDURE — 7100000001 HC PACU RECOVERY - ADDTL 15 MIN: Performed by: SURGERY

## 2024-03-12 DEVICE — VENTRALIGHT ST MESH, 6" (15.2 CM), CIRCLE
Type: IMPLANTABLE DEVICE | Site: ABDOMEN | Status: FUNCTIONAL
Brand: VENTRALIGHT

## 2024-03-12 RX ORDER — LIDOCAINE HYDROCHLORIDE 40 MG/ML
INJECTION, SOLUTION RETROBULBAR; TOPICAL PRN
Status: DISCONTINUED | OUTPATIENT
Start: 2024-03-12 | End: 2024-03-12 | Stop reason: SDUPTHER

## 2024-03-12 RX ORDER — DIPHENHYDRAMINE HYDROCHLORIDE 50 MG/ML
25 INJECTION INTRAMUSCULAR; INTRAVENOUS ONCE
Status: COMPLETED | OUTPATIENT
Start: 2024-03-12 | End: 2024-03-12

## 2024-03-12 RX ORDER — ROCURONIUM BROMIDE 10 MG/ML
INJECTION, SOLUTION INTRAVENOUS PRN
Status: DISCONTINUED | OUTPATIENT
Start: 2024-03-12 | End: 2024-03-12 | Stop reason: SDUPTHER

## 2024-03-12 RX ORDER — PROPOFOL 10 MG/ML
INJECTION, EMULSION INTRAVENOUS PRN
Status: DISCONTINUED | OUTPATIENT
Start: 2024-03-12 | End: 2024-03-12 | Stop reason: SDUPTHER

## 2024-03-12 RX ORDER — MIDAZOLAM HYDROCHLORIDE 1 MG/ML
INJECTION INTRAMUSCULAR; INTRAVENOUS PRN
Status: DISCONTINUED | OUTPATIENT
Start: 2024-03-12 | End: 2024-03-12 | Stop reason: SDUPTHER

## 2024-03-12 RX ORDER — FENTANYL CITRATE 50 UG/ML
25 INJECTION, SOLUTION INTRAMUSCULAR; INTRAVENOUS EVERY 5 MIN PRN
Status: DISCONTINUED | OUTPATIENT
Start: 2024-03-12 | End: 2024-03-12 | Stop reason: HOSPADM

## 2024-03-12 RX ORDER — CYCLOBENZAPRINE HCL 10 MG
10 TABLET ORAL 3 TIMES DAILY PRN
Qty: 21 TABLET | Refills: 0 | Status: SHIPPED | OUTPATIENT
Start: 2024-03-12 | End: 2024-03-22

## 2024-03-12 RX ORDER — PROMETHAZINE HYDROCHLORIDE 25 MG/1
25 TABLET ORAL EVERY 6 HOURS PRN
Qty: 30 TABLET | Refills: 0 | Status: SHIPPED | OUTPATIENT
Start: 2024-03-12

## 2024-03-12 RX ORDER — MAGNESIUM HYDROXIDE 1200 MG/15ML
LIQUID ORAL CONTINUOUS PRN
Status: DISCONTINUED | OUTPATIENT
Start: 2024-03-12 | End: 2024-03-12 | Stop reason: HOSPADM

## 2024-03-12 RX ORDER — NALOXONE HYDROCHLORIDE 0.4 MG/ML
INJECTION, SOLUTION INTRAMUSCULAR; INTRAVENOUS; SUBCUTANEOUS PRN
Status: DISCONTINUED | OUTPATIENT
Start: 2024-03-12 | End: 2024-03-12 | Stop reason: HOSPADM

## 2024-03-12 RX ORDER — DEXAMETHASONE SODIUM PHOSPHATE 10 MG/ML
INJECTION INTRAMUSCULAR; INTRAVENOUS PRN
Status: DISCONTINUED | OUTPATIENT
Start: 2024-03-12 | End: 2024-03-12 | Stop reason: SDUPTHER

## 2024-03-12 RX ORDER — OXYCODONE HYDROCHLORIDE AND ACETAMINOPHEN 5; 325 MG/1; MG/1
1 TABLET ORAL EVERY 6 HOURS PRN
Qty: 28 TABLET | Refills: 0 | Status: SHIPPED | OUTPATIENT
Start: 2024-03-12 | End: 2024-03-19

## 2024-03-12 RX ORDER — SODIUM CHLORIDE 0.9 % (FLUSH) 0.9 %
5-40 SYRINGE (ML) INJECTION EVERY 12 HOURS SCHEDULED
Status: DISCONTINUED | OUTPATIENT
Start: 2024-03-12 | End: 2024-03-12 | Stop reason: HOSPADM

## 2024-03-12 RX ORDER — ONDANSETRON 2 MG/ML
INJECTION INTRAMUSCULAR; INTRAVENOUS PRN
Status: DISCONTINUED | OUTPATIENT
Start: 2024-03-12 | End: 2024-03-12 | Stop reason: SDUPTHER

## 2024-03-12 RX ORDER — BUPIVACAINE HYDROCHLORIDE 5 MG/ML
INJECTION, SOLUTION PERINEURAL PRN
Status: DISCONTINUED | OUTPATIENT
Start: 2024-03-12 | End: 2024-03-12 | Stop reason: HOSPADM

## 2024-03-12 RX ORDER — SODIUM CHLORIDE 0.9 % (FLUSH) 0.9 %
5-40 SYRINGE (ML) INJECTION PRN
Status: DISCONTINUED | OUTPATIENT
Start: 2024-03-12 | End: 2024-03-12 | Stop reason: HOSPADM

## 2024-03-12 RX ORDER — PROMETHAZINE HYDROCHLORIDE 25 MG/ML
12.5 INJECTION, SOLUTION INTRAMUSCULAR; INTRAVENOUS ONCE
Status: COMPLETED | OUTPATIENT
Start: 2024-03-12 | End: 2024-03-12

## 2024-03-12 RX ORDER — SODIUM CHLORIDE, SODIUM LACTATE, POTASSIUM CHLORIDE, CALCIUM CHLORIDE 600; 310; 30; 20 MG/100ML; MG/100ML; MG/100ML; MG/100ML
INJECTION, SOLUTION INTRAVENOUS CONTINUOUS
Status: DISCONTINUED | OUTPATIENT
Start: 2024-03-12 | End: 2024-03-12 | Stop reason: HOSPADM

## 2024-03-12 RX ORDER — GLYCOPYRROLATE 0.2 MG/ML
INJECTION INTRAMUSCULAR; INTRAVENOUS PRN
Status: DISCONTINUED | OUTPATIENT
Start: 2024-03-12 | End: 2024-03-12 | Stop reason: SDUPTHER

## 2024-03-12 RX ORDER — HEPARIN SODIUM 5000 [USP'U]/ML
5000 INJECTION, SOLUTION INTRAVENOUS; SUBCUTANEOUS ONCE
Status: COMPLETED | OUTPATIENT
Start: 2024-03-12 | End: 2024-03-12

## 2024-03-12 RX ORDER — FENTANYL CITRATE 50 UG/ML
INJECTION, SOLUTION INTRAMUSCULAR; INTRAVENOUS PRN
Status: DISCONTINUED | OUTPATIENT
Start: 2024-03-12 | End: 2024-03-12 | Stop reason: SDUPTHER

## 2024-03-12 RX ORDER — LIDOCAINE HYDROCHLORIDE 10 MG/ML
INJECTION, SOLUTION EPIDURAL; INFILTRATION; INTRACAUDAL; PERINEURAL PRN
Status: DISCONTINUED | OUTPATIENT
Start: 2024-03-12 | End: 2024-03-12 | Stop reason: SDUPTHER

## 2024-03-12 RX ORDER — SODIUM CHLORIDE 9 MG/ML
INJECTION, SOLUTION INTRAVENOUS PRN
Status: DISCONTINUED | OUTPATIENT
Start: 2024-03-12 | End: 2024-03-12 | Stop reason: HOSPADM

## 2024-03-12 RX ADMIN — FENTANYL CITRATE 25 MCG: 50 INJECTION INTRAMUSCULAR; INTRAVENOUS at 15:08

## 2024-03-12 RX ADMIN — ONDANSETRON 4 MG: 2 INJECTION INTRAMUSCULAR; INTRAVENOUS at 13:35

## 2024-03-12 RX ADMIN — HEPARIN SODIUM 5000 UNITS: 5000 INJECTION INTRAVENOUS; SUBCUTANEOUS at 09:50

## 2024-03-12 RX ADMIN — FENTANYL CITRATE 50 MCG: 50 INJECTION, SOLUTION INTRAMUSCULAR; INTRAVENOUS at 10:43

## 2024-03-12 RX ADMIN — ROCURONIUM BROMIDE 10 MG: 10 INJECTION INTRAVENOUS at 12:24

## 2024-03-12 RX ADMIN — HYDROMORPHONE HYDROCHLORIDE 0.5 MG: 1 INJECTION, SOLUTION INTRAMUSCULAR; INTRAVENOUS; SUBCUTANEOUS at 14:13

## 2024-03-12 RX ADMIN — ROCURONIUM BROMIDE 10 MG: 10 INJECTION INTRAVENOUS at 11:06

## 2024-03-12 RX ADMIN — MIDAZOLAM 2 MG: 1 INJECTION INTRAMUSCULAR; INTRAVENOUS at 10:41

## 2024-03-12 RX ADMIN — SODIUM CHLORIDE, POTASSIUM CHLORIDE, SODIUM LACTATE AND CALCIUM CHLORIDE: 600; 310; 30; 20 INJECTION, SOLUTION INTRAVENOUS at 09:46

## 2024-03-12 RX ADMIN — PROPOFOL 50 MG: 10 INJECTION, EMULSION INTRAVENOUS at 10:47

## 2024-03-12 RX ADMIN — FENTANYL CITRATE 25 MCG: 50 INJECTION INTRAMUSCULAR; INTRAVENOUS at 14:26

## 2024-03-12 RX ADMIN — PHENYLEPHRINE HYDROCHLORIDE 100 MCG: 10 INJECTION INTRAVENOUS at 11:03

## 2024-03-12 RX ADMIN — LIDOCAINE HYDROCHLORIDE 4 ML: 40 INJECTION, SOLUTION RETROBULBAR; TOPICAL at 10:46

## 2024-03-12 RX ADMIN — GLYCOPYRROLATE 0.2 MG: 0.2 INJECTION INTRAMUSCULAR; INTRAVENOUS at 11:05

## 2024-03-12 RX ADMIN — ROCURONIUM BROMIDE 10 MG: 10 INJECTION INTRAVENOUS at 12:01

## 2024-03-12 RX ADMIN — DIPHENHYDRAMINE HYDROCHLORIDE 25 MG: 50 INJECTION INTRAMUSCULAR; INTRAVENOUS at 14:48

## 2024-03-12 RX ADMIN — PROPOFOL 150 MG: 10 INJECTION, EMULSION INTRAVENOUS at 10:43

## 2024-03-12 RX ADMIN — SUGAMMADEX 200 MG: 100 INJECTION, SOLUTION INTRAVENOUS at 13:43

## 2024-03-12 RX ADMIN — LIDOCAINE HYDROCHLORIDE 40 MG: 10 INJECTION, SOLUTION EPIDURAL; INFILTRATION; INTRACAUDAL; PERINEURAL at 10:43

## 2024-03-12 RX ADMIN — FENTANYL CITRATE 50 MCG: 50 INJECTION, SOLUTION INTRAMUSCULAR; INTRAVENOUS at 10:47

## 2024-03-12 RX ADMIN — HYDROMORPHONE HYDROCHLORIDE 0.5 MG: 1 INJECTION, SOLUTION INTRAMUSCULAR; INTRAVENOUS; SUBCUTANEOUS at 11:29

## 2024-03-12 RX ADMIN — PROMETHAZINE HYDROCHLORIDE 12.5 MG: 25 INJECTION INTRAMUSCULAR; INTRAVENOUS at 14:52

## 2024-03-12 RX ADMIN — ROCURONIUM BROMIDE 20 MG: 10 INJECTION INTRAVENOUS at 12:53

## 2024-03-12 RX ADMIN — ROCURONIUM BROMIDE 50 MG: 10 INJECTION INTRAVENOUS at 10:43

## 2024-03-12 RX ADMIN — HYDROMORPHONE HYDROCHLORIDE 0.5 MG: 1 INJECTION, SOLUTION INTRAMUSCULAR; INTRAVENOUS; SUBCUTANEOUS at 10:58

## 2024-03-12 RX ADMIN — HYDROMORPHONE HYDROCHLORIDE 0.5 MG: 1 INJECTION, SOLUTION INTRAMUSCULAR; INTRAVENOUS; SUBCUTANEOUS at 14:01

## 2024-03-12 RX ADMIN — Medication 2000 MG: at 10:53

## 2024-03-12 RX ADMIN — PHENYLEPHRINE HYDROCHLORIDE 100 MCG: 10 INJECTION INTRAVENOUS at 10:56

## 2024-03-12 RX ADMIN — FENTANYL CITRATE 50 MCG: 50 INJECTION, SOLUTION INTRAMUSCULAR; INTRAVENOUS at 12:53

## 2024-03-12 RX ADMIN — DEXAMETHASONE SODIUM PHOSPHATE 6 MG: 10 INJECTION INTRAMUSCULAR; INTRAVENOUS at 10:56

## 2024-03-12 ASSESSMENT — PAIN DESCRIPTION - DESCRIPTORS
DESCRIPTORS: ACHING

## 2024-03-12 ASSESSMENT — PAIN DESCRIPTION - LOCATION
LOCATION: ABDOMEN

## 2024-03-12 ASSESSMENT — PAIN SCALES - GENERAL
PAINLEVEL_OUTOF10: 0
PAINLEVEL_OUTOF10: 7
PAINLEVEL_OUTOF10: 6
PAINLEVEL_OUTOF10: 9

## 2024-03-12 ASSESSMENT — PAIN DESCRIPTION - ORIENTATION
ORIENTATION: RIGHT

## 2024-03-12 ASSESSMENT — LIFESTYLE VARIABLES: SMOKING_STATUS: 1

## 2024-03-12 NOTE — DISCHARGE INSTRUCTIONS
Discharge Instructions for Bariatric Surgery     You had a robotic ventral hernia repair    Home Care     It is important to keep the incisions clean and dry to promote healing.   Shower with soap and rinse and dry thoroughly.  If incisions are oozing, you may cover with clean gauze.  Use the incentive spirometer every couple of hours. This is to make sure you are breathing deeply and keeping the air sacs within your lungs as open as possible to prevent respiratory problems.      Diet    Regular    Physical Activity    When home, we recommend that you take frequent walks, as tolerated, to prevent complications and increase your endurance.   Ask your doctor when you will be able to return to work. You may need to wait 2-6 weeks.    Do not drive unless you are no longer using pain medications  Do not lift anything over ten pounds for 4 weeks.      Medications    Remember to avoid aspirin, aspirin-containing products, and nonsteroidal anti-inflammatory drugs (NSAIDs, such as ibuprofen, naproxen, etc.).   If you were taking these medications before the procedure, and had to stop, ask your doctor when you can resume taking them. Be sure to review your medications with your primary care provider at your follow up office visit.    Lifestyle Changes    Changing your diet and level of activity are the biggest lifestyle changes associated with your success. Be aware that you may have emotional ups and downs after this surgery.           Follow-up   Follow up with your primary care physician in one week.   Follow up with Dr. Mckeon in 1 week. If you don't already have a scheduled  appointment, please call the office at [phone].    Call Your Doctor If Any of the Following Occurs   Monitor your recovery once you leave the hospital. If any of the following occur, call your doctor:   Signs of infection, including fever above 100.5F    Redness, swelling, increasing pain, excessive bleeding, or any discharge from the incision

## 2024-03-12 NOTE — OP NOTE
Operative Note      Patient: Kristen Zavala  YOB: 1970  MRN: [id number]    Date of Procedure: 3/12/2024    Pre-Op Diagnosis Codes:     * Incisional hernia, without obstruction or gangrene [K43.2]    Post-Op Diagnosis: Same       Procedure(s):  XI ROBOTIC LAPAROSCOPIC INCISONAL HERNIA REPAIR WITH MESH    Surgeon(s):  Pancho Mckeon DO    Assistant:   First Assistant: Alee Staples RN    Anesthesia: General    Estimated Blood Loss (mL): Minimal    Complications: None    Specimens:   ID Type Source Tests Collected by Time Destination   1 : Urine for culture, new catheter insertion Urine Urine, indwelling catheter CULTURE, URINE Alec Jauregui RN 3/12/2024 1049        Implants:  Implant Name Type Inv. Item Serial No.  Lot No. LRB No. Used Action   MESH SURG DIA6IN UNCOATED M WT MFIL PROPYLENE CIR HYDRGEL - [id number]  MESH SURG DIA6IN UNCOATED M WT MFIL PROPYLENE CIR HYDRGEL  BARD DAVOL-WD [id number] N/A 1 Implanted         Drains:   [REMOVED] Urinary Catheter 03/12/24 2 Way (Removed)       History: Patient is a 53-year-old female who had been having  pain and pressure for last several weeks at region above the umbilicus .   Due to patient's continued pain, decision was made to bring the patient to the operating room for a robotic assisted laparoscopic incisional hernia repair with mesh. This procedure, including risks, benefits, alternatives and complications have been fully reviewed with the patient and informed consent was obtained. All questions were answered appropriately.      Procedure: Patient was brought to the operating room and placed in a supine position on the operating table.  Patient underwent general anesthesia via endotracheal intubation.  Patient was given ancef for perioperative antibiotics.  SCDs were placed.  A timeout was performed to confirm the patient, procedure, allergies and all other concerns.  The abdomen was then prepped with ChloraPrep and draped in the usual

## 2024-03-12 NOTE — ANESTHESIA PROCEDURE NOTES
Airway  Date/Time: 3/12/2024 10:46 AM  Urgency: elective    Airway not difficult    General Information and Staff    Patient location during procedure: OR  Anesthesiologist: Edwin Erazo MD  Resident/CRNA: Verónica Lino MD  Performed: resident/CRNA/CAA   Performed by: Verónica Lino MD  Authorized by: Edwin Erazo MD      Indications and Patient Condition  Indications for airway management: anesthesia  Spontaneous Ventilation: absent  Sedation level: deep  Preoxygenated: yes  Patient position: sniffing  Mask difficulty assessment: vent by bag mask    Final Airway Details  Final airway type: endotracheal airway      Successful airway: ETT  Cuffed: yes   Successful intubation technique: direct laryngoscopy  Facilitating devices/methods: intubating stylet  Endotracheal tube insertion site: oral  Blade: John Paul  Blade size: #3  ETT size (mm): 7.5  Cormack-Lehane Classification: grade I - full view of glottis  Placement verified by: chest auscultation and capnometry   Measured from: lips  ETT to lips (cm): 21  Number of attempts at approach: 1  Ventilation between attempts: none  Number of other approaches attempted: 0    no

## 2024-03-12 NOTE — H&P
History and Physical    Pt Name: Kristen Zavala  MRN: [id number]  YOB: 1970  Date of evaluation: 3/12/2024  Primary Care Physician: Tasha Siddiqi PA-C    SUBJECTIVE:   History of Chief Complaint:    Kristen Zavala is a 53 y.o. female who is scheduled today for XI ROBOTIC LAPAROSCOPIC INCISONAL HERNIA REPAIR , MESH, POSSIBLE OPEN. She is s/p incisional hernia repair on 10/31/23. She reports noticing a new hernia above her umbilical region approximately one month ago. She denies abdominal pain, but does report some sensitivity to that site.   Allergies  has No Known Allergies.  Medications  Prior to Admission medications    Medication Sig Start Date End Date Taking? Authorizing Provider   Calcium Carbonate-Vit D-Min (CALCIUM 1200 PO) Take by mouth daily   Yes Sonia Keyes MD   Multiple Vitamins-Minerals (BARIATRIC MULTIVITAMINS/IRON PO) Take 1 tablet by mouth at bedtime    Sonia Keyes MD   promethazine (PHENERGAN) 25 MG tablet Take 1 tablet by mouth every 6 hours as needed for Nausea  Patient not taking: Reported on 2/19/2024 12/14/22   Pancho Mckeon DO   lithium 300 MG capsule Take 1 capsule by mouth Daily    Sonia Keyes MD   ARIPiprazole (ABILIFY) 5 MG tablet Take 2 tablets by mouth daily 2/4/22   Sonia Keyes MD   buPROPion (WELLBUTRIN SR) 100 MG extended release tablet Take 3 tablets by mouth 2 times daily 150 morning 150 at night 2/17/22 3/8/24  Sonia Keyes MD   levothyroxine (SYNTHROID) 88 MCG tablet Take 75 mcg by mouth Daily 3/3/22   Sonia Keyes MD   traZODone (DESYREL) 100 MG tablet TAKE 2 TABLETS (200 MG) BY MOUTH NIGHTLY AS NEEDED FOR SLEEP.  Patient not taking: Reported on 8/5/2022 1/11/22 8/5/22  Sonia Keyes MD     Past Medical History    has a past medical history of Arthritis, Asthma, Bipolar 1 disorder (HCC), Depression, MICHELLE (generalized anxiety disorder), GERD (gastroesophageal reflux disease), Incisional hernia without  15 and oxygen saturation is 100%.   CONSTITUTIONAL:alert & oriented x 3, no acute distress. Calm and pleasant.   SKIN:  Warm and dry, no rashes on exposed areas of skin   HEAD:  Normocephalic, atraumatic   EYES: PERRL.  EOMs intact.   EARS:  Equal bilaterally, no edema or thickening, skin is intact without lumps or lesions. No discharge. Hearing grossly WNL.    NOSE:  Nares patent.  No rhinorrhea   MOUTH/THROAT:  Mucous membranes moist, tongue is pink, uvula midline, teeth appear to be intact  NECK:Full ROM  LUNGS: Respirations even and non-labored. Clear to auscultation bilaterally, no wheezes, rales, or rhonchi.    CARDIOVASCULAR: Regular rate and rhythm, no murmurs/rubs/gallops   ABDOMEN: soft, non-tender, non-distended, bowel sounds active x 4, hernia present above umbilical region, area is non-tender to palpation.  EXTREMITIES: No edema bilateral lower extremities.  No varicosities bilateral lower extremities.   NEUROLOGIC: CN II-XII are grossly intact.  Gait not assessed.  IMPRESSIONS:   Incisional hernia, without obstruction or gangrene    PLANS:   XI ROBOTIC LAPAROSCOPIC INCISONAL HERNIA REPAIR , MESH, POSSIBLE OPEN     JANEE DANIEL CNP   Electronically signed 3/12/2024 at 9:39 AM

## 2024-03-12 NOTE — ANESTHESIA PRE PROCEDURE
11:48 AM       HCG (If Applicable):   Lab Results   Component Value Date    HCG NEGATIVE 12/13/2022        ABGs: No results found for: \"PHART\", \"PO2ART\", \"VVO8MDR\", \"BMV3ANG\", \"BEART\", \"O8BMJMMN\"     Type & Screen (If Applicable):  No results found for: \"LABABO\", \"LABRH\"    Drug/Infectious Status (If Applicable):  No results found for: \"HIV\", \"HEPCAB\"    COVID-19 Screening (If Applicable): No results found for: \"COVID19\"        Anesthesia Evaluation  Patient summary reviewed and Nursing notes reviewed   no history of anesthetic complications:   Airway: Mallampati: II  TM distance: >3 FB   Neck ROM: full  Mouth opening: > = 3 FB   Dental: normal exam         Pulmonary:normal exam    (+)  COPD:    sleep apnea:       asthma: current smoker                           Cardiovascular:Negative CV ROS  Exercise tolerance: good (>4 METS)        ECG reviewed                        Neuro/Psych:   (+) psychiatric history:depression/anxiety             GI/Hepatic/Renal:   (+) GERD: well controlled          Endo/Other:    (+) hypothyroidism::..                 Abdominal:             Vascular:          Other Findings:             Anesthesia Plan      general     ASA 3       Induction: intravenous.    MIPS: Postoperative opioids intended, Prophylactic antiemetics administered and Postoperative trial extubation.  Anesthetic plan and risks discussed with patient.    Use of blood products discussed with patient whom consented to blood products.    Plan discussed with resident.                    Edwin Erazo MD   3/12/2024

## 2024-03-12 NOTE — PROGRESS NOTES
[facility] [place]  PROGRESS NOTE    Shift date: 3/12/2024   Shift day: Tuesday   Shift # 1    Room # STVZ OR POOL RM/NONE   Name: Kristen Zavala                Evangelical: Roman Catholic, non-practicing   Place of Roman Catholic: None    Referral:  Pre-op prayer    Admit Date & Time: 3/12/2024  8:50 AM    Assessment:  Kristen Zavala is a 53 y.o. female in the hospital because of an hernia operation. Upon entering the room writer observes pt, her  and two children. Pt engaged well in conversation. There was an underlying anxiousness in her voice.       Intervention:  Writer introduced self and title as  Writer offered space for pt and family  to express feelings, needs, and concerns and provided a ministry presence. Pt spoke of her nervousness about the procedure and mentioned having many other surgeries in the past.   provided support through active listening and words of affirmation.     Outcome:  Pt amd family were receptive to spiritual health visit and expressed gratitude for support.     Plan:  Chaplains will remain available to offer spiritual and emotional support as needed.      Electronically signed by Chaplain Silvia, on 3/12/2024 at 10:30 AM.  [facility]  [facility]  [phone]      03/12/24 1029   Encounter Summary   Encounter Overview/Reason  Spiritual/Emotional Needs;Pre-Op   Service Provided For: Patient   Referral/Consult From: Nurse   Support System Spouse;Children   Last Encounter  03/12/24   Complexity of Encounter Moderate   Begin Time 1010   End Time  1025   Total Time Calculated 15 min   Assessment/Intervention/Outcome   Assessment Coping   Intervention Active listening;Explored/Affirmed feelings, thoughts, concerns;Prayer (assurance of)/[place]   Outcome Engaged in conversation;Expressed feelings, needs, and concerns;Expressed Gratitude;Receptive

## 2024-03-13 LAB
MICROORGANISM SPEC CULT: NO GROWTH
SPECIMEN DESCRIPTION: NORMAL

## 2024-03-13 NOTE — ANESTHESIA POSTPROCEDURE EVALUATION
Department of Anesthesiology  Postprocedure Note    Patient: Kristen Zavala  MRN: [id number]  YOB: 1970  Date of evaluation: 3/13/2024    Procedure Summary       Date: 03/12/24 Room / Location: [address]    Anesthesia Start: 1038 Anesthesia Stop: 1358    Procedure: XI ROBOTIC LAPAROSCOPIC INCISONAL HERNIA REPAIR WITH MESH (Abdomen) Diagnosis:       Incisional hernia, without obstruction or gangrene      (Incisional hernia, without obstruction or gangrene [K43.2])    Surgeons: Pancho Mckeon DO Responsible Provider: Edwin Erazo MD    Anesthesia Type: general ASA Status: 3            Anesthesia Type: No value filed.    Cleopatra Phase I: Cleopatra Score: 10    Cleopatra Phase II: Cleopatra Score: 10    Anesthesia Post Evaluation    Patient location during evaluation: bedside  Patient participation: complete - patient participated  Level of consciousness: awake  Airway patency: patent  Nausea & Vomiting: no nausea and no vomiting  Cardiovascular status: hemodynamically stable  Respiratory status: acceptable  Hydration status: stable  Comments: /85   Pulse 73   Temp 97.9 °F (36.6 °C)   Resp 12   Ht 1.651 m (5' 5\")   Wt 79.4 kg (175 lb)   SpO2 93%   BMI 29.12 kg/m²           No notable events documented.

## 2024-03-29 ENCOUNTER — OFFICE VISIT (OUTPATIENT)
Dept: BARIATRICS/WEIGHT MGMT | Age: 54
End: 2024-03-29

## 2024-03-29 VITALS
BODY MASS INDEX: 29.32 KG/M2 | SYSTOLIC BLOOD PRESSURE: 118 MMHG | WEIGHT: 176 LBS | DIASTOLIC BLOOD PRESSURE: 74 MMHG | RESPIRATION RATE: 18 BRPM | HEIGHT: 65 IN | HEART RATE: 76 BPM | TEMPERATURE: 98.8 F

## 2024-03-29 DIAGNOSIS — E65 ABDOMINAL PANNUS: ICD-10-CM

## 2024-03-29 DIAGNOSIS — Z87.19 S/P LAPAROSCOPIC HERNIA REPAIR: Primary | ICD-10-CM

## 2024-03-29 DIAGNOSIS — Z98.890 S/P LAPAROSCOPIC HERNIA REPAIR: Primary | ICD-10-CM

## 2024-03-29 PROCEDURE — 99024 POSTOP FOLLOW-UP VISIT: CPT | Performed by: NURSE PRACTITIONER

## 2024-03-29 RX ORDER — CYCLOBENZAPRINE HCL 10 MG
10 TABLET ORAL 3 TIMES DAILY PRN
Qty: 21 TABLET | Refills: 0 | Status: SHIPPED | OUTPATIENT
Start: 2024-03-29 | End: 2024-04-08

## 2024-03-29 RX ORDER — NYSTATIN 100000 [USP'U]/G
POWDER TOPICAL
Qty: 60 G | Refills: 1 | Status: SHIPPED | OUTPATIENT
Start: 2024-03-29

## 2024-03-29 NOTE — PROGRESS NOTES
[facility] INVASIVE BARIATRIC SURG  [address]  [address]  [place] [id number]  Dept: [phone]    1 month post op visit - incision hernia repair  PROGRESS NOTE     Chief Complaint   Patient presents with    Follow-Up from Hospital     Post op hernia repair        Patient: Kristen Zavala      Service Date: 3/29/2024  Visit:   1 month post op    Medical Record #: [id number]  Date of Surgery: 3/12/2024    History: 53 y.o. female who presents today for a post op follow up for incision hernia repair. Patient reports regular bowel movements. Patient denies fever and chills.  She had gastric bypass surgery on 12/13/22. This is her second incision hernia repair in the last 5 months. She informed Dr. Mckeon at her most recent surgery that she has a hx of EDS. She states it is normal for her healing to be more delayed.     She has had some nausea and dry heaving. Denies vomiting.    She states that her post op pain is a consistent 7/10. She states she hasn't take anything pain, not even tylenol. She states that with her previous incision hernia repair - it took almost 6 weeks for her to start noticing the pain to be improving. She states its tolerable and she is still able to get things done that needs to be done. She feels like she is doing well.     She is requesting a refill on the flexeril because that does help her abdominal muscles to relax.    She states she has also noticed that the skin folds on her abdomen are starting to get irritated and moist at times with her weight loss.     Compliant with bariatric MVI and Calcium? yes        Review of Systems:     General  Negative for: [] Weight Change   [x] Fatigue   [x] Fevers & Chills    [] Appetite change [] Other:    Positive for: [x] Weight Change   [] Fatigue   [] Fevers & Chills    [] Appetite change [] Other:   Cardiac  Negative for: [x] Chest Pain   [] Difficulty Breathing   [] Leg Cramps [x] Edema of

## 2024-04-03 ENCOUNTER — TELEPHONE (OUTPATIENT)
Dept: BARIATRICS/WEIGHT MGMT | Age: 54
End: 2024-04-03

## 2024-04-09 ENCOUNTER — TELEPHONE (OUTPATIENT)
Dept: BARIATRICS/WEIGHT MGMT | Age: 54
End: 2024-04-09

## 2024-04-10 NOTE — TELEPHONE ENCOUNTER
Patient states she could not come in this week, her mother-in -law passed and I scheduled her with Dr Mckeon next week thursday

## 2024-04-11 NOTE — TELEPHONE ENCOUNTER
Patient reported received voicemail from Dr. Mckeon yesterday. Returning call. Reported will be by her phone all day.

## 2024-06-21 LAB
ALBUMIN SERPL-MCNC: 4.2 G/DL
ALP BLD-CCNC: 88 U/L
ALT SERPL-CCNC: 29 U/L
ANION GAP SERPL CALCULATED.3IONS-SCNC: 9 MMOL/L
AST SERPL-CCNC: 24 U/L
BASOPHILS ABSOLUTE: 0.1 /ΜL
BASOPHILS RELATIVE PERCENT: 1.3 %
BILIRUB SERPL-MCNC: 0.6 MG/DL (ref 0.1–1.4)
BUN BLDV-MCNC: 15 MG/DL
CALCIUM SERPL-MCNC: 9.8 MG/DL
CHLORIDE BLD-SCNC: 105 MMOL/L
CHOLESTEROL, TOTAL: 183 MG/DL
CHOLESTEROL/HDL RATIO: 2.3
CO2: 25 MMOL/L
CREAT SERPL-MCNC: 0.77 MG/DL
EGFR: >90
EOSINOPHILS ABSOLUTE: 0.1 /ΜL
EOSINOPHILS RELATIVE PERCENT: 1.7 %
ESTIMATED AVERAGE GLUCOSE: 100
FERRITIN: 151 NG/ML (ref 9–150)
FOLATE: 21.3
GLUCOSE BLD-MCNC: 96 MG/DL
HBA1C MFR BLD: 5.1 %
HCT VFR BLD CALC: 36.1 % (ref 36–46)
HDLC SERPL-MCNC: 79 MG/DL (ref 35–70)
HEMOGLOBIN: 13.3 G/DL (ref 12–16)
IRON: 72
LDL CHOLESTEROL CALCULATED: 89 MG/DL (ref 0–160)
LYMPHOCYTES ABSOLUTE: 1.7 /ΜL
LYMPHOCYTES RELATIVE PERCENT: 26.7 %
MAGNESIUM: 2 MG/DL
MCH RBC QN AUTO: 34.6 PG
MCHC RBC AUTO-ENTMCNC: 36.9 G/DL
MCV RBC AUTO: 94 FL
MONOCYTES ABSOLUTE: 0.4 /ΜL
MONOCYTES RELATIVE PERCENT: 6.4 %
NEUTROPHILS ABSOLUTE: 4.2 /ΜL
NEUTROPHILS RELATIVE PERCENT: 63.9 %
NONHDLC SERPL-MCNC: ABNORMAL MG/DL
PDW BLD-RTO: 13.6 %
PLATELET # BLD: 292 K/ΜL
PMV BLD AUTO: 7.1 FL
POTASSIUM SERPL-SCNC: 4 MMOL/L
PTH INTACT: 43
RBC # BLD: 3.84 10^6/ΜL
SODIUM BLD-SCNC: 139 MMOL/L
T4 FREE: 0.9
TOTAL IRON BINDING CAPACITY: 386
TOTAL PROTEIN: 6.8
TRIGL SERPL-MCNC: 77 MG/DL
TSH SERPL DL<=0.05 MIU/L-ACNC: 7.29 UIU/ML
VITAMIN B-12: 1366
VITAMIN D 25-HYDROXY: 95
VITAMIN D2, 25 HYDROXY: NORMAL
VITAMIN D3,25 HYDROXY: NORMAL
VLDLC SERPL CALC-MCNC: 15 MG/DL
WBC # BLD: 6.5 10^3/ML

## 2024-06-24 DIAGNOSIS — E60 LOW ZINC LEVEL: ICD-10-CM

## 2024-06-24 DIAGNOSIS — K90.89 OTHER SPECIFIED INTESTINAL MALABSORPTION: ICD-10-CM

## 2024-06-24 DIAGNOSIS — E03.9 ACQUIRED HYPOTHYROIDISM: ICD-10-CM

## 2024-06-24 DIAGNOSIS — Z98.84 S/P GASTRIC BYPASS: ICD-10-CM

## 2024-06-25 DIAGNOSIS — K90.89 OTHER SPECIFIED INTESTINAL MALABSORPTION: ICD-10-CM

## 2024-06-25 DIAGNOSIS — Z98.84 S/P GASTRIC BYPASS: ICD-10-CM

## 2024-06-25 DIAGNOSIS — E60 LOW ZINC LEVEL: ICD-10-CM

## 2024-06-25 DIAGNOSIS — E03.9 ACQUIRED HYPOTHYROIDISM: ICD-10-CM

## 2024-08-05 ENCOUNTER — OFFICE VISIT (OUTPATIENT)
Dept: BARIATRICS/WEIGHT MGMT | Age: 54
End: 2024-08-05
Payer: COMMERCIAL

## 2024-08-05 VITALS
DIASTOLIC BLOOD PRESSURE: 78 MMHG | OXYGEN SATURATION: 98 % | BODY MASS INDEX: 27.48 KG/M2 | WEIGHT: 171 LBS | SYSTOLIC BLOOD PRESSURE: 110 MMHG | HEART RATE: 78 BPM | HEIGHT: 66 IN

## 2024-08-05 DIAGNOSIS — E60 LOW ZINC LEVEL: ICD-10-CM

## 2024-08-05 DIAGNOSIS — Z87.19 S/P HERNIA REPAIR: ICD-10-CM

## 2024-08-05 DIAGNOSIS — E66.3 OVERWEIGHT (BMI 25.0-29.9): ICD-10-CM

## 2024-08-05 DIAGNOSIS — Z98.890 S/P HERNIA REPAIR: ICD-10-CM

## 2024-08-05 DIAGNOSIS — E03.9 ACQUIRED HYPOTHYROIDISM: Primary | ICD-10-CM

## 2024-08-05 DIAGNOSIS — K90.9 INTESTINAL MALABSORPTION, UNSPECIFIED TYPE: ICD-10-CM

## 2024-08-05 DIAGNOSIS — Z98.84 S/P GASTRIC BYPASS: ICD-10-CM

## 2024-08-05 PROCEDURE — 99214 OFFICE O/P EST MOD 30 MIN: CPT | Performed by: NURSE PRACTITIONER

## 2024-08-05 RX ORDER — FERROUS SULFATE 325(65) MG
1 TABLET ORAL DAILY
COMMUNITY
Start: 2024-05-20

## 2024-08-05 ASSESSMENT — ENCOUNTER SYMPTOMS
SHORTNESS OF BREATH: 0
COUGH: 0
WHEEZING: 0
VOMITING: 0
CHEST TIGHTNESS: 0
NAUSEA: 0
ABDOMINAL PAIN: 0

## 2024-08-05 NOTE — PROGRESS NOTES
[facility] INVASIVE BARIATRIC SURG  [address]  [address]  [place] [phone]  Dept: [phone]    SURGICAL WEIGHT LOSS MANAGEMENT PROGRAM  PROGRESS NOTE     CC: Weight Loss    Patient: Kristen Zavala      Service Date: 8/5/2024  Visit:   18 month post op    Medical Record #: [id number]  Date of Surgery:   12/13/2022    History: 54 y.o. female who presents today for a post op follow up for gastric bypass. Patient reports regular bowel movements. She denies vomiting, fever, and chills. Occasionally having nausea - she states it is manageable and not concerned.     She had labs done in June 2024 - B1 and B12 were elevated. She states that she was not taking any additional B complex, hair skin nails, B12 or B1.   Zinc was low - she has not started OTC zinc  Vitamin D was high end of normal - she doesn't take an additional vitamin d supplement. She is unsure if her calcium had vitamin D in it. She states she did stop the calcium supplement because she didn't remember exactly what was told to her when she was called about her results.  TSH is elevated, on levothyroxine - she has an appt with PCP today. + hx of hypothyroidism  Her iron and ferritin were normal. She was taking bariatric MVI with iron along with additional iron supplement. She has stopped the additional iron about 1 month ago.     She denies any issues with hernias and she has healed well from multiple surgeries.      Height: 1.664 m (5' 5.5\")  Highest Weight:   253 lbs      Current Visit Weight Information  Weight: 77.6 kg (171 lb)   BMI: Body mass index is 28.02 kg/m².    Weight loss since surgery:  82 lbs    Exercising?   [x] Yes    [] No     Review of Systems:     Review of Systems   Constitutional:  Negative for appetite change, chills, diaphoresis, fatigue and fever.   Respiratory:  Negative for cough, chest tightness, shortness of breath and wheezing.    Cardiovascular:  Negative for chest 
physician and determine a plan for remaining active.    Monitor/Evaluate:  Diet tolerance, protein intake, vitamin adherence, fluid intake, frequency of meals/snacks, activity, and follow-up at next post-op appointment    Meseret Maurer MS, RD, LD  Clinical Dietitian  [facility]  [phone]

## 2025-02-07 ENCOUNTER — TELEPHONE (OUTPATIENT)
Dept: BARIATRICS/WEIGHT MGMT | Age: 55
End: 2025-02-07

## 2025-02-07 NOTE — TELEPHONE ENCOUNTER
Patient c/o R sided pain under breast, radiating to back.  She is also stated she vomits at least 2x weekly. She states this has been going on since December. States pain is 9/10.  I instructed patient to go to Emergency room.

## 2025-02-18 LAB
ALBUMIN: 4.1 G/DL
ALP BLD-CCNC: 84 U/L
ALT SERPL-CCNC: 18 U/L
ANION GAP SERPL CALCULATED.3IONS-SCNC: 9 MMOL/L
AST SERPL-CCNC: 17 U/L
BASOPHILS ABSOLUTE: 0 /ΜL
BASOPHILS RELATIVE PERCENT: 0.6 %
BILIRUB SERPL-MCNC: 0.4 MG/DL (ref 0.1–1.4)
BUN BLDV-MCNC: 12 MG/DL
CALCIUM SERPL-MCNC: 9.8 MG/DL
CHLORIDE BLD-SCNC: 106 MMOL/L
CHOLESTEROL, TOTAL: 212 MG/DL
CHOLESTEROL/HDL RATIO: 3
CO2: 24 MMOL/L
CREAT SERPL-MCNC: 0.77 MG/DL
EOSINOPHILS ABSOLUTE: 0.2 /ΜL
EOSINOPHILS RELATIVE PERCENT: 2.2 %
ESTIMATED AVERAGE GLUCOSE: 100
FERRITIN: 113 NG/ML (ref 9–150)
FOLATE: NORMAL
GFR, ESTIMATED: NORMAL
GLUCOSE BLD-MCNC: 127 MG/DL
HBA1C MFR BLD: 5.1 %
HCT VFR BLD CALC: 37.1 % (ref 36–46)
HDLC SERPL-MCNC: 71 MG/DL (ref 35–70)
HEMOGLOBIN: 12.7 G/DL (ref 12–16)
IRON: 75
LDL CHOLESTEROL: 111
LYMPHOCYTES ABSOLUTE: 1.9 /ΜL
LYMPHOCYTES RELATIVE PERCENT: 26.7 %
MAGNESIUM: 2 MG/DL
MCH RBC QN AUTO: 32.1 PG
MCHC RBC AUTO-ENTMCNC: 34.2 G/DL
MCV RBC AUTO: 94 FL
MONOCYTES ABSOLUTE: 0.4 /ΜL
MONOCYTES RELATIVE PERCENT: 5.1 %
NEUTROPHILS ABSOLUTE: 4.5 /ΜL
NEUTROPHILS RELATIVE PERCENT: 65.4 %
NONHDLC SERPL-MCNC: ABNORMAL MG/DL
PDW BLD-RTO: 13.3 %
PLATELET # BLD: 323 K/ΜL
PMV BLD AUTO: 7.3 FL
POTASSIUM SERPL-SCNC: 3.9 MMOL/L
PTH INTACT: 48
RBC # BLD: 3.95 10^6/ΜL
SODIUM BLD-SCNC: 139 MMOL/L
T4 FREE: 0.88
TOTAL IRON BINDING CAPACITY: 384
TOTAL PROTEIN: 6.8 G/DL (ref 6.4–8.2)
TRIGL SERPL-MCNC: 148 MG/DL
TSH SERPL DL<=0.05 MIU/L-ACNC: NORMAL M[IU]/L
VITAMIN B-12: >1500
VITAMIN D 25-HYDROXY: 45.9
VITAMIN D2, 25 HYDROXY: NORMAL
VITAMIN D3,25 HYDROXY: NORMAL
VLDLC SERPL CALC-MCNC: ABNORMAL MG/DL
WBC # BLD: 6.9 10^3/ML

## 2025-02-19 ENCOUNTER — TELEPHONE (OUTPATIENT)
Dept: BARIATRICS/WEIGHT MGMT | Age: 55
End: 2025-02-19

## 2025-02-19 DIAGNOSIS — Z87.19 S/P HERNIA REPAIR: ICD-10-CM

## 2025-02-19 DIAGNOSIS — E60 LOW ZINC LEVEL: ICD-10-CM

## 2025-02-19 DIAGNOSIS — K90.9 INTESTINAL MALABSORPTION, UNSPECIFIED TYPE: ICD-10-CM

## 2025-02-19 DIAGNOSIS — Z98.84 S/P GASTRIC BYPASS: ICD-10-CM

## 2025-02-19 DIAGNOSIS — E66.3 OVERWEIGHT (BMI 25.0-29.9): ICD-10-CM

## 2025-02-19 DIAGNOSIS — Z98.890 S/P HERNIA REPAIR: ICD-10-CM

## 2025-02-19 DIAGNOSIS — E03.9 ACQUIRED HYPOTHYROIDISM: ICD-10-CM

## 2025-02-19 NOTE — TELEPHONE ENCOUNTER
Pt called inquiring about an earlier time avail to see meghan Perez to rtc to offer a an earlier time with a different provider.

## 2025-02-20 ENCOUNTER — OFFICE VISIT (OUTPATIENT)
Dept: BARIATRICS/WEIGHT MGMT | Age: 55
End: 2025-02-20

## 2025-02-20 VITALS
HEIGHT: 66 IN | HEART RATE: 70 BPM | BODY MASS INDEX: 28.61 KG/M2 | SYSTOLIC BLOOD PRESSURE: 110 MMHG | DIASTOLIC BLOOD PRESSURE: 70 MMHG | WEIGHT: 178 LBS

## 2025-02-20 DIAGNOSIS — Z98.84 S/P GASTRIC BYPASS: Primary | ICD-10-CM

## 2025-02-20 DIAGNOSIS — Z98.890 S/P HERNIA REPAIR: ICD-10-CM

## 2025-02-20 DIAGNOSIS — Z87.19 S/P HERNIA REPAIR: ICD-10-CM

## 2025-02-20 DIAGNOSIS — K90.89 OTHER SPECIFIED INTESTINAL MALABSORPTION: ICD-10-CM

## 2025-02-20 DIAGNOSIS — R10.9 LEFT SIDED ABDOMINAL PAIN: ICD-10-CM

## 2025-02-20 RX ORDER — ESZOPICLONE 3 MG/1
3 TABLET, FILM COATED ORAL NIGHTLY
COMMUNITY
Start: 2025-02-17

## 2025-02-20 RX ORDER — PANTOPRAZOLE SODIUM 20 MG/1
20 TABLET, DELAYED RELEASE ORAL DAILY
Qty: 30 TABLET | Refills: 3 | Status: SHIPPED | OUTPATIENT
Start: 2025-02-20

## 2025-02-20 NOTE — PROGRESS NOTES
[facility] INVASIVE BARIATRIC SURG  [address]  [address]  [place] [id number]  Dept: [phone]    SURGICAL WEIGHT LOSS MANAGEMENT PROGRAM  PROGRESS NOTE     CC: Weight Loss    Patient: Kristen Zavala      Service Date: 2/20/2025  Visit:   2 years    Medical Record #: [id number]  Date of Surgery:  12/13/22    History: 54 y.o. female who presents today for a post op follow up for gastric bypass. Also underwent incisional hernia repair with mesh 3/12/24. She continues to have left abdominal pain and soreness. Underwent a CT 2 weeks ago at [facility] which was unremarkable. Was started on probiotics and diagnosed with EDS. Not taking any heart burn medications. Compliant with vitamin intake. Patient reports regular bowel movements. She denies nausea, vomiting, fever, and chills. She states the pain is well controlled.      Height: 1.664 m (5' 5.5\")  Highest Weight: 253  lbs    Current Visit Weight Information  Weight: 80.7 kg (178 lb)   BMI: Body mass index is 29.17 kg/m².    Weight loss since surgery:  75 lbs    Liver pathology:    [] NA    [] No Gross path    [] Liver Steatosis   [x] Discussed w/ pt   [] Referred to GI    Exercising?   [x] Yes    [] No     Prior imaging/testing  Patient had a CT abdomen and pelvis 2/7/25, which showed: Nonspecific inflammatory changes between the excluded portion of the stomach in the gastric pouch, correlate clinically.  No extraluminal gas, abscess, or free intraperitoneal air.  Air-fluid level noted in the excluded portion of the stomach.       Review of Systems:     General  Negative for: [] Weight Change   [x] Fatigue   [x] Fevers & Chills    [] Appetite change [] Other:    Positive for: [x] Weight Change   [] Fatigue   [] Fevers & Chills    [] Appetite change [] Other:   Cardiac  Negative for: [x] Chest Pain   [] Difficulty Breathing   [] Leg Cramps [x] Edema of Lower Extremeties    [] Left   [] Right      Positive

## 2025-02-20 NOTE — PROGRESS NOTES
Medical Nutrition Therapy for Metabolic and Bariatric Surgery  ~2 Years Post-Op RNY Bypass Nutrition Follow-Up      Nutrition Assessment:  Patient reports:  tolerating diet, never eating small, frequent meals.  sometimes eating protein first,  sometimes eating protein portions with all meals and snacks.  Drinking at least 36 oz of fluid and sugar free beverages daily. Patient typically drinking water, coffee, and hot tea  consistently taking vitamins and minerals.   riding indoor cycle 30 minutes daily to remain active.     All questions answered. Patient aware of how to contact RD if needs arise. RD to remain available.    24-hr diet recall scanned into chart.  Multivitamin/Mineral Intake: Yes, bariatric vitamin QOD.  Reports was previously told to do this by Doctor due to a high lab value in the past.     Calcium Intake: Yes, calcium pills    Vitals:   Vitals:    02/20/25 1529   BP: 110/70   Site: Right Upper Arm   Position: Sitting   Cuff Size: Large Adult   Pulse: 70   Weight: 80.7 kg (178 lb)   Height: 1.664 m (5' 5.5\")      Body mass index is 29.17 kg/m².    Nutrition Diagnosis:   Inadequate food and beverage intake r/t WLS as evidenced by loss of excess body weight lost 75 lbs over  ~2 Years.    Nutrition Intervention:  Diet: Bariatric Diet, 60-80 gm of protein, and 48-64 oz of fluid    Nutrition Education: Bariatric Binder (diet guidelines and recipes)    Goal:   Continue bariatric diet and continue to add variety to diet as tolerated.   Sip on water or sugar-free fluid throughout the day. Aiming for a minimum of 64 oz per day.   Eat small, frequent meals containing protein, as tolerated.   Consistently take MVIs and minerals to prevent nutrient deficiencies.   Discuss activity with physician and determine a plan for remaining active.    Monitor/Evaluate:  Diet tolerance, protein intake, vitamin adherence, fluid intake, frequency of meals/snacks, activity, and follow-up at next post-op

## 2025-02-21 ENCOUNTER — PREP FOR PROCEDURE (OUTPATIENT)
Dept: BARIATRICS/WEIGHT MGMT | Age: 55
End: 2025-02-21

## 2025-02-21 PROBLEM — E66.9 OBESITY: Status: ACTIVE | Noted: 2025-02-21

## 2025-02-21 PROBLEM — K21.9 GERD (GASTROESOPHAGEAL REFLUX DISEASE): Status: ACTIVE | Noted: 2025-02-21

## 2025-03-10 DIAGNOSIS — Z87.19 S/P HERNIA REPAIR: ICD-10-CM

## 2025-03-10 DIAGNOSIS — E66.3 OVERWEIGHT (BMI 25.0-29.9): ICD-10-CM

## 2025-03-10 DIAGNOSIS — E60 LOW ZINC LEVEL: ICD-10-CM

## 2025-03-10 DIAGNOSIS — K90.9 INTESTINAL MALABSORPTION, UNSPECIFIED TYPE: ICD-10-CM

## 2025-03-10 DIAGNOSIS — Z98.84 S/P GASTRIC BYPASS: ICD-10-CM

## 2025-03-10 DIAGNOSIS — Z98.890 S/P HERNIA REPAIR: ICD-10-CM

## 2025-03-10 DIAGNOSIS — E03.9 ACQUIRED HYPOTHYROIDISM: ICD-10-CM

## 2025-03-21 ENCOUNTER — TELEPHONE (OUTPATIENT)
Dept: BARIATRICS/WEIGHT MGMT | Age: 55
End: 2025-03-21

## 2025-03-21 NOTE — TELEPHONE ENCOUNTER
Hello,    You are schedule for an EGD on 03/28/25 at the [facility]. Please arrive at 7:30am.       [facility]:  [address] Emergency Room Entr[address] , [place] [address]    Patient Instructions: PLEASE READ!!!  The night before your procedure no food or drink after midnight.  NO alcohol 24 hours prior to procedure   Take your medications with sips of water, except those that need to be taken with food.  If you take aspirin, Plavix, Coumadin (Warfarin) or another blood thinner please ask the prescribing provider if you can stop it 7 days prior to the EGD.  If you are on any GLP-1 medications (Trulicity, Semaglutide, Mounjaro,Zepbound, Victoza, Saxenda) you will \"STOP\" TWO weeks prior to procedure.  If you are having an EGD with [facility] and are taking any PPIs (Prilosec, Prevacid, Protonix or Nexium) please stop it 7 days prior to your procedure.  If you are late you may be asked to reschedule.   You MUST bring a support person with you, they need to drive you home.  You will not be able to drive after the procedure.  If you are a female, you may be asked to take a urine pregnancy test.       Any questions , concerns or need to cancel, please call [phone].    Thank you,    [facility].

## 2025-03-27 ENCOUNTER — ANESTHESIA EVENT (OUTPATIENT)
Dept: OPERATING ROOM | Age: 55
End: 2025-03-27
Payer: COMMERCIAL

## 2025-03-27 RX ORDER — ARIPIPRAZOLE 10 MG/1
10 TABLET ORAL NIGHTLY
COMMUNITY
Start: 2025-01-10

## 2025-03-27 RX ORDER — LEVOTHYROXINE SODIUM 75 UG/1
75 TABLET ORAL DAILY
COMMUNITY
Start: 2025-01-12

## 2025-03-27 RX ORDER — BUPROPION HYDROCHLORIDE 150 MG/1
150 TABLET, EXTENDED RELEASE ORAL 2 TIMES DAILY
COMMUNITY
Start: 2024-12-19

## 2025-03-27 RX ORDER — TRETINOIN 0.5 MG/G
CREAM TOPICAL
COMMUNITY
Start: 2024-10-14

## 2025-03-28 ENCOUNTER — HOSPITAL ENCOUNTER (OUTPATIENT)
Age: 55
Setting detail: OUTPATIENT SURGERY
Discharge: HOME | End: 2025-03-28
Attending: SURGERY | Admitting: SURGERY
Payer: COMMERCIAL

## 2025-03-28 ENCOUNTER — ANESTHESIA (OUTPATIENT)
Dept: OPERATING ROOM | Age: 55
End: 2025-03-28
Payer: COMMERCIAL

## 2025-03-28 VITALS
DIASTOLIC BLOOD PRESSURE: 81 MMHG | TEMPERATURE: 98.1 F | WEIGHT: 178 LBS | HEART RATE: 54 BPM | SYSTOLIC BLOOD PRESSURE: 117 MMHG | RESPIRATION RATE: 18 BRPM | HEIGHT: 66 IN | BODY MASS INDEX: 28.61 KG/M2 | OXYGEN SATURATION: 100 %

## 2025-03-28 DIAGNOSIS — E66.9 OBESITY: ICD-10-CM

## 2025-03-28 DIAGNOSIS — K21.9 GERD (GASTROESOPHAGEAL REFLUX DISEASE): ICD-10-CM

## 2025-03-28 PROBLEM — K28.9 GASTROJEJUNAL ULCER: Status: ACTIVE | Noted: 2025-03-28

## 2025-03-28 PROCEDURE — 7100000011 HC PHASE II RECOVERY - ADDTL 15 MIN: Performed by: SURGERY

## 2025-03-28 PROCEDURE — 3609012400 HC EGD TRANSORAL BIOPSY SINGLE/MULTIPLE: Performed by: SURGERY

## 2025-03-28 PROCEDURE — 43239 EGD BIOPSY SINGLE/MULTIPLE: CPT | Performed by: SURGERY

## 2025-03-28 PROCEDURE — 3700000000 HC ANESTHESIA ATTENDED CARE: Performed by: SURGERY

## 2025-03-28 PROCEDURE — 88305 TISSUE EXAM BY PATHOLOGIST: CPT

## 2025-03-28 PROCEDURE — 7100000010 HC PHASE II RECOVERY - FIRST 15 MIN: Performed by: SURGERY

## 2025-03-28 PROCEDURE — 2580000003 HC RX 258: Performed by: ANESTHESIOLOGY

## 2025-03-28 PROCEDURE — 2709999900 HC NON-CHARGEABLE SUPPLY: Performed by: SURGERY

## 2025-03-28 PROCEDURE — 6360000002 HC RX W HCPCS

## 2025-03-28 RX ORDER — DIPHENHYDRAMINE HYDROCHLORIDE 50 MG/ML
12.5 INJECTION, SOLUTION INTRAMUSCULAR; INTRAVENOUS
Status: DISCONTINUED | OUTPATIENT
Start: 2025-03-28 | End: 2025-03-28 | Stop reason: HOSPADM

## 2025-03-28 RX ORDER — FENTANYL CITRATE 50 UG/ML
25 INJECTION, SOLUTION INTRAMUSCULAR; INTRAVENOUS
Refills: 0 | Status: DISCONTINUED | OUTPATIENT
Start: 2025-03-28 | End: 2025-03-28 | Stop reason: HOSPADM

## 2025-03-28 RX ORDER — SODIUM CHLORIDE 0.9 % (FLUSH) 0.9 %
5-40 SYRINGE (ML) INJECTION EVERY 12 HOURS SCHEDULED
Status: DISCONTINUED | OUTPATIENT
Start: 2025-03-28 | End: 2025-03-28 | Stop reason: HOSPADM

## 2025-03-28 RX ORDER — SODIUM CHLORIDE, SODIUM LACTATE, POTASSIUM CHLORIDE, CALCIUM CHLORIDE 600; 310; 30; 20 MG/100ML; MG/100ML; MG/100ML; MG/100ML
INJECTION, SOLUTION INTRAVENOUS CONTINUOUS
Status: DISCONTINUED | OUTPATIENT
Start: 2025-03-28 | End: 2025-03-28 | Stop reason: HOSPADM

## 2025-03-28 RX ORDER — FENTANYL CITRATE 50 UG/ML
50 INJECTION, SOLUTION INTRAMUSCULAR; INTRAVENOUS
Refills: 0 | Status: DISCONTINUED | OUTPATIENT
Start: 2025-03-28 | End: 2025-03-28 | Stop reason: HOSPADM

## 2025-03-28 RX ORDER — SODIUM CHLORIDE 0.9 % (FLUSH) 0.9 %
5-40 SYRINGE (ML) INJECTION PRN
Status: DISCONTINUED | OUTPATIENT
Start: 2025-03-28 | End: 2025-03-28 | Stop reason: HOSPADM

## 2025-03-28 RX ORDER — MIDAZOLAM HYDROCHLORIDE 2 MG/2ML
1 INJECTION, SOLUTION INTRAMUSCULAR; INTRAVENOUS EVERY 10 MIN PRN
Status: DISCONTINUED | OUTPATIENT
Start: 2025-03-28 | End: 2025-03-28 | Stop reason: HOSPADM

## 2025-03-28 RX ORDER — LIDOCAINE HYDROCHLORIDE 10 MG/ML
1 INJECTION, SOLUTION INFILTRATION; PERINEURAL
Status: DISCONTINUED | OUTPATIENT
Start: 2025-03-28 | End: 2025-03-28 | Stop reason: HOSPADM

## 2025-03-28 RX ORDER — LIDOCAINE HYDROCHLORIDE 10 MG/ML
INJECTION, SOLUTION EPIDURAL; INFILTRATION; INTRACAUDAL; PERINEURAL
Status: DISCONTINUED | OUTPATIENT
Start: 2025-03-28 | End: 2025-03-28 | Stop reason: SDUPTHER

## 2025-03-28 RX ORDER — NALOXONE HYDROCHLORIDE 0.4 MG/ML
INJECTION, SOLUTION INTRAMUSCULAR; INTRAVENOUS; SUBCUTANEOUS PRN
Status: DISCONTINUED | OUTPATIENT
Start: 2025-03-28 | End: 2025-03-28 | Stop reason: HOSPADM

## 2025-03-28 RX ORDER — ONDANSETRON 2 MG/ML
4 INJECTION INTRAMUSCULAR; INTRAVENOUS
Status: DISCONTINUED | OUTPATIENT
Start: 2025-03-28 | End: 2025-03-28 | Stop reason: HOSPADM

## 2025-03-28 RX ORDER — SUCRALFATE 1 G/1
1 TABLET ORAL 3 TIMES DAILY
Qty: 270 TABLET | Refills: 0 | Status: SHIPPED | OUTPATIENT
Start: 2025-03-28 | End: 2025-06-26

## 2025-03-28 RX ORDER — PROPOFOL 10 MG/ML
INJECTION, EMULSION INTRAVENOUS
Status: DISCONTINUED | OUTPATIENT
Start: 2025-03-28 | End: 2025-03-28 | Stop reason: SDUPTHER

## 2025-03-28 RX ORDER — FENTANYL CITRATE 50 UG/ML
25 INJECTION, SOLUTION INTRAMUSCULAR; INTRAVENOUS EVERY 5 MIN PRN
Status: DISCONTINUED | OUTPATIENT
Start: 2025-03-28 | End: 2025-03-28 | Stop reason: HOSPADM

## 2025-03-28 RX ORDER — FENTANYL CITRATE 50 UG/ML
50 INJECTION, SOLUTION INTRAMUSCULAR; INTRAVENOUS EVERY 5 MIN PRN
Status: DISCONTINUED | OUTPATIENT
Start: 2025-03-28 | End: 2025-03-28 | Stop reason: HOSPADM

## 2025-03-28 RX ORDER — ALBUTEROL SULFATE 0.83 MG/ML
2.5 SOLUTION RESPIRATORY (INHALATION) EVERY 8 HOURS PRN
Status: DISCONTINUED | OUTPATIENT
Start: 2025-03-28 | End: 2025-03-28 | Stop reason: HOSPADM

## 2025-03-28 RX ORDER — SODIUM CHLORIDE 9 MG/ML
INJECTION, SOLUTION INTRAVENOUS PRN
Status: DISCONTINUED | OUTPATIENT
Start: 2025-03-28 | End: 2025-03-28 | Stop reason: HOSPADM

## 2025-03-28 RX ADMIN — PROPOFOL 150 MG: 10 INJECTION, EMULSION INTRAVENOUS at 09:24

## 2025-03-28 RX ADMIN — LIDOCAINE HYDROCHLORIDE 50 MG: 10 INJECTION, SOLUTION EPIDURAL; INFILTRATION; INTRACAUDAL; PERINEURAL at 09:24

## 2025-03-28 RX ADMIN — SODIUM CHLORIDE, POTASSIUM CHLORIDE, SODIUM LACTATE AND CALCIUM CHLORIDE: 600; 310; 30; 20 INJECTION, SOLUTION INTRAVENOUS at 09:17

## 2025-03-28 ASSESSMENT — LIFESTYLE VARIABLES: SMOKING_STATUS: 1

## 2025-03-28 ASSESSMENT — PAIN - FUNCTIONAL ASSESSMENT: PAIN_FUNCTIONAL_ASSESSMENT: NONE - DENIES PAIN

## 2025-03-28 NOTE — H&P
Subjective     The patient is a 54 y.o. female who is here to undergo EGD for left upper quadrant pain       Past Medical History:   Diagnosis Date    Arthritis     Asthma     Bipolar 1 disorder (HCC)     Depression     MICHELLE (generalized anxiety disorder)     GERD (gastroesophageal reflux disease)     Incisional hernia without obstruction or gangrene     Obesity     Thyroid disease     hyperthyroid per patient    Under care of service provider 11/28/2022    psych-Dr rodrigues-last visit 10/2022    Under care of service provider 03/08/2024    pcp-tennille esposito-last visit Jan 2025    Under care of service provider 03/08/2024    endocrine-enrique-last visit 1/2024    Wears eyeglasses    .    Review of Systems - A complete 14 point review of systems was performed.  All was negative unless otherwise documented in HPI.    Allergies:  No Known Allergies    Past Surgical History:  Past Surgical History:   Procedure Laterality Date    ANKLE SURGERY  1996    ENDOMETRIAL ABLATION  2014    HERNIA REPAIR N/A 10/31/2023    XI ROBOTIC LAPAROSCOPIC INCISIONAL HERNIA REPAIR,  MESH, DIAGNOSTIC LAPAROSCOPY performed by Pancho Mckeon DO at [facility] OR    HERNIA REPAIR  03/12/2024    XI ROBOTIC LAPAROSCOPIC INCISONAL HERNIA REPAIR WITH MESH (Abdomen)    ROLANDO-EN-Y GASTRIC BYPASS N/A 12/13/2022    XI ROBOTIC LAPAROSCOPIC GASTRIC BYPASS ROLANDO-EN-Y, EGD- GI SCHEDULED performed by Pancho Mckeon DO at [facility] OR    TOTAL HIP ARTHROPLASTY Left 2016    TOTAL HIP ARTHROPLASTY Right 2020    TOTAL KNEE ARTHROPLASTY Right 2018    TUBAL LIGATION  2014    UPPER GASTROINTESTINAL ENDOSCOPY N/A 08/05/2022    EGD BIOPSY performed by Pancho Mckeon DO at [facility] OR    VENTRAL HERNIA REPAIR N/A 3/12/2024    XI ROBOTIC LAPAROSCOPIC INCISONAL HERNIA REPAIR WITH MESH performed by Pancho Mckeon DO at [facility] OR       Family History:  Family History   Problem Relation Age of Onset    No Known Problems Mother

## 2025-03-28 NOTE — ANESTHESIA PRE PROCEDURE
Department of Anesthesiology  Preprocedure Note       Name:  Kristen Zavala   Age:  54 y.o.  :  1970                                          MRN:  [id number]         Date:  3/28/2025      Surgeon: Surgeon(s):  Pancho Mckeon DO    Procedure: Procedure(s):  ESOPHAGOGASTRODUODENOSCOPY    Medications prior to admission:   Prior to Admission medications    Medication Sig Start Date End Date Taking? Authorizing Provider   tretinoin (RETIN-A) 0.05 % cream APPLY 1 APPLICATION ON THE SKIN EVERY DAY 10/14/24  Yes Sonia Keyes MD   ARIPiprazole (ABILIFY) 10 MG tablet Take 1 tablet by mouth nightly 1/10/25  Yes Sonia Keyes MD   buPROPion (WELLBUTRIN SR) 150 MG extended release tablet Take 1 tablet by mouth 2 times daily 24  Yes Sonia Keyes MD   levothyroxine (SYNTHROID) 75 MCG tablet Take 1 tablet by mouth daily 25  Yes Sonia Keyes MD   eszopiclone 3 MG TABS Take 1 tablet by mouth nightly. 25  Yes Sonia Keyes MD   pantoprazole (PROTONIX) 20 MG tablet Take 1 tablet by mouth daily 25  Yes Pancho Mckeon DO   ferrous sulfate (IRON 325) 325 (65 Fe) MG tablet Take 1 tablet by mouth daily 24  Yes Sonia Keyes MD   Calcium Carbonate-Vit D-Min (CALCIUM 1200 PO) Take by mouth daily   Yes Sonia Keyes MD   Multiple Vitamins-Minerals (BARIATRIC MULTIVITAMINS/IRON PO) Take 1 tablet by mouth at bedtime   Yes Sonia Keyes MD   lithium 300 MG capsule Take 1 capsule by mouth Daily   Yes Sonia Keyes MD   nystatin (MYCOSTATIN) 556906 UNIT/GM powder Apply 3 times daily. 3/29/24   Penny Spencer APRN - CNP   traZODone (DESYREL) 100 MG tablet TAKE 2 TABLETS (200 MG) BY MOUTH NIGHTLY AS NEEDED FOR SLEEP.  Patient not taking: Reported on 2022  Sonia Keyes MD       Current medications:    No current facility-administered medications for this encounter.       Allergies:  No Known

## 2025-03-28 NOTE — OP NOTE
[facility]  [facility] OR  [address] [address]  Dept: [phone]  Loc: [phone]    Preoperative Diagnosis:  Gastrojejunal Ulcer    Postoperative Diagnosis: Same, superficial with edema less than 0.5 cm    Procedure: Esophagogastroduodenoscopy with Biopsy    Surgeon: Pancho Mckeon DO    Assistant:    Anesthesia: MAC, see anesthesia records    Specimen:    1) gastric pouch    No infection    Findings: 4 cm pouch, small hiatal hernia, mild Posterior GJ ulcer    EBL: NONE    Operative Narrative:  The risks and benefits of the procedure were explained to the patient in detail, including but not limited to: bleeding, infection, need for further surgery, damage to surrounding structures and perforation.  The patient consented with the procedure.    The patient was taken to the endoscopic suite and placed in lateral position.  Oxygen was administered via nasal cannula and a mouth guard placed.  MAC was administered via the anesthesia team.  The endoscope was then advanced into the oropharynx and passed into the esophagus under direct visualization.  The scope was further advanced under direct visualization into the esophageal lumen and down into the gastric pouch.   The scope was advanced past the anastomosis and the helga limb surveyed.  The helga limb appeared patent and without signs of ischemia.  The scope was withdrawn and the blind pouch surveyed, without lesion.  The scope withdrawn and a gastric pouch biopsy taken with cold forceps, hemostasis noted.  The helga limb and gastric pouch without trauma or bleeding. The esophagus without lesion    The patient tolerated the procedure well    PPI and Carafate

## 2025-03-28 NOTE — PROGRESS NOTES
Discharge instructions discuss with pt and , verbalizes understanding. All belongings given to patient. Discharge per wheelchair in a stable condition.  
Dr. Mckeon @ bedside, spoke with patient and family. No new PACU order @ this time.  
no

## 2025-03-28 NOTE — DISCHARGE INSTRUCTIONS
POST-ENDOSCOPY INSTRUCTIONS    1. ACTIVITY   No driving, operating machinery, or making important decisions for 24 hours.    Resume normal activity after 24 hours.  You may return to work after 24 hours.    2. DIET    EGD: Resume your usual diet unless specified below.                Diet Modification: Regular    3. MEDICATIONS  (Do not consume alcohol, tranquilizers, or sleeping medications for 24 hours unless advised by your physician)                 Resume your usual medications    4. PHYSICIAN FOLLOW-UP                 Please continue with your previously scheduled appointments                 See your primary care physician as planned.      6. NORMAL CHANGES YOU MAY EXPERIENCE AFTER ENDOSCOPY:      EGD:  Sore throat, some abdominal pain and cramping.            7. CALL YOUR PHYSICIAN IF YOU EXPERIENCE ANY OF THE FOLLOWING      A.  Passing blood rectally or vomiting blood (color may be red or black)      B.  Severe abdominal pain or tenderness (that is not relieved by passing air)      C.  Fever, chills, or excessive sweating      D.  Persistent nausea or vomiting      E.  Redness or swelling at the IV site    If you have additional questions, PLEASE call your doctor or the [facility] center at [phone]

## 2025-03-28 NOTE — ANESTHESIA POSTPROCEDURE EVALUATION
Department of Anesthesiology  Postprocedure Note    Patient: Kristen Zavala  MRN: [id number]  YOB: 1970  Date of evaluation: 3/28/2025    Procedure Summary       Date: 03/28/25 Room / Location: [address]    Anesthesia Start: 0918 Anesthesia Stop: 0935    Procedure: ESOPHAGOGASTRODUODENOSCOPY BIOPSY Diagnosis:       Obesity      GERD (gastroesophageal reflux disease)      (Obesity [E66.9])      (GERD (gastroesophageal reflux disease) [K21.9])    Surgeons: Pancho Mckeon DO Responsible Provider: Carlos Blandon MD    Anesthesia Type: MAC ASA Status: 3            Anesthesia Type: No value filed.    Cleopatra Phase I:      Cleopatra Phase II:      Anesthesia Post Evaluation    Patient location during evaluation: PACU  Patient participation: complete - patient participated  Level of consciousness: awake  Pain score: 1  Airway patency: patent  Nausea & Vomiting: no nausea and no vomiting  Cardiovascular status: blood pressure returned to baseline and hemodynamically stable  Respiratory status: acceptable  Hydration status: euvolemic  Pain management: adequate    No notable events documented.

## 2025-04-01 LAB — SURGICAL PATHOLOGY REPORT: NORMAL

## 2025-04-17 ENCOUNTER — OFFICE VISIT (OUTPATIENT)
Dept: BARIATRICS/WEIGHT MGMT | Age: 55
End: 2025-04-17
Payer: COMMERCIAL

## 2025-04-17 VITALS
BODY MASS INDEX: 28.77 KG/M2 | OXYGEN SATURATION: 96 % | HEART RATE: 72 BPM | WEIGHT: 179 LBS | DIASTOLIC BLOOD PRESSURE: 70 MMHG | SYSTOLIC BLOOD PRESSURE: 110 MMHG | HEIGHT: 66 IN

## 2025-04-17 DIAGNOSIS — Z98.84 S/P GASTRIC BYPASS: Primary | ICD-10-CM

## 2025-04-17 DIAGNOSIS — Z87.19 S/P HERNIA REPAIR: ICD-10-CM

## 2025-04-17 DIAGNOSIS — K90.89 OTHER SPECIFIED INTESTINAL MALABSORPTION: ICD-10-CM

## 2025-04-17 DIAGNOSIS — Z98.890 S/P HERNIA REPAIR: ICD-10-CM

## 2025-04-17 PROCEDURE — 99213 OFFICE O/P EST LOW 20 MIN: CPT | Performed by: SURGERY

## 2025-04-17 NOTE — PROGRESS NOTES
Medical Nutrition Therapy for Metabolic and Bariatric Surgery  Annual Post-Op RNY Bypass Nutrition Follow-Up      Nutrition Assessment:  Patient reports plans to get back on track starting Monday. No questions at this time.   Discussed ways to increase water intake, which has been an ongoing goal for her. Sets water bottles out for the day. Might consider having alarm/amanda reminder to drink water when home every hour.   Provided healthy protein + fiber snack list for ideas.    All questions answered. Patient aware of how to contact RD if needs arise.       Vitals:   Vitals:    04/17/25 1006   BP: 110/70   Pulse: 72   SpO2: 96%   Weight: 81.2 kg (179 lb)   Height: 1.664 m (5' 5.5\")      Body mass index is 29.33 kg/m².    Nutrition Diagnosis:   Inadequate food and beverage intake r/t WLS as evidenced by loss of excess body weight lost 74 lbs over  2.5 Years.    Nutrition Intervention:  Diet: Bariatric Diet, 60-80 gm of protein, and 48-64 oz of fluid    Nutrition Education: Bariatric Binder (diet guidelines and recipes)    Goal:   Continue bariatric diet and continue to add variety to diet as tolerated.   Sip on water or sugar-free fluid throughout the day. Aiming for a minimum of 64 oz per day.   Eat small, frequent meals containing protein, as tolerated.   Consistently take MVIs and minerals to prevent nutrient deficiencies.   Discuss activity with physician and determine a plan for remaining active.    Monitor/Evaluate:  Diet tolerance, protein intake, vitamin adherence, fluid intake, frequency of meals/snacks, activity, and follow-up at next post-op appointment    Meseret Herzog MS, RD, LD  Clinical Dietitian  [facility]  [phone]  
duplicate  
stain.     Review of Systems:     General  Negative for: [] Weight Change   [x] Fatigue   [x] Fevers & Chills    [] Appetite change [] Other:    Positive for: [x] Weight Change   [] Fatigue   [] Fevers & Chills    [] Appetite change [] Other:   Cardiac  Negative for: [x] Chest Pain   [] Difficulty Breathing   [] Leg Cramps [x] Edema of Lower Extremeties    [] Left   [] Right      Positive for: [] Chest Pain   [] Difficulty Breathing   [] Leg Cramps [] Edema of Lower Extremeties    [] Left   [] Right   Pulmonary  Negative for: [x] Shortness of Breath [] Wheeze [x] Cough  [x] Calf Pain     Positive for: [] Shortness of Breath [] Wheeze [] Cough  [] Calf Pain   Gastro-Intestinal Negative for: [] Heartburn   [] Reflux   [] Dysphagia   [] Melena   [] BRBPR  [x] Vomiting   [x] Abdominal Pain   [] Diarrhea     [] Constipation  [] Other:     Positive for: [] Heartburn   [] Reflux   [] Dysphagia   [] Melena   [] BRBPR  [] Vomiting   [] Abdominal Pain   [] Diarrhea     [] Constipation  [] Other:    Muskuloskeletal Negative for: [] Joint pain   [] Back pain   [] Knee Pain   [] Muscle weakness [x] Hernia   [x] Edema [] Other:     Positive for: [] Joint pain   [] Back pain   [] Knee Pain   [] Muscle weakness [] Hernia   [] Edema [] Other:    Neurologic Negative for: [x] Syncope   [] Insomnia   [x] Being treated for depression  [] Other:     Positive for: [] Syncope   [] Insomnia   [] Being treated for depression  [] Other:    Skin Negative for: [x] Wound:   [] Open   [] Draining   [] Incisional     [x] Rash   [] Hair Loss  [] Other:     Positive for: [] Wound:   [] Open   [] Draining    [] Incisional  [] Rash   [] Hair Loss  [] Other:          Physical Assessment:     /70   Pulse 72   Ht 1.664 m (5' 5.5\")   Wt 81.2 kg (179 lb)   SpO2 96%   BMI 29.33 kg/m²   General Negative for:  [x] Lapses in memory   [x] Unusual Stress   [x] Diffic Concen [] Unable to sleep   [] Eating in response to stress   [] Other:    Positive

## 2025-05-19 NOTE — TELEPHONE ENCOUNTER
Kristen Zavala is calling to request a refill on the following medication(s):    Last Visit Date (If Applicable):  11/10/2023    Next Visit Date:    Visit date not found    Medication Request:  Requested Prescriptions     Pending Prescriptions Disp Refills    pantoprazole (PROTONIX) 20 MG tablet [Pharmacy Med Name: PANTOPRAZOLE SOD DR 20 MG TAB] 90 tablet 1     Sig: TAKE 1 TABLET BY MOUTH EVERY DAY

## 2025-05-20 RX ORDER — PANTOPRAZOLE SODIUM 20 MG/1
20 TABLET, DELAYED RELEASE ORAL DAILY
Qty: 90 TABLET | Refills: 1 | Status: SHIPPED | OUTPATIENT
Start: 2025-05-20

## 2025-05-22 ENCOUNTER — TELEMEDICINE (OUTPATIENT)
Dept: BARIATRICS/WEIGHT MGMT | Age: 55
End: 2025-05-22

## 2025-05-22 DIAGNOSIS — Z98.84 S/P GASTRIC BYPASS: Primary | ICD-10-CM

## 2025-05-22 DIAGNOSIS — K90.89 OTHER SPECIFIED INTESTINAL MALABSORPTION: ICD-10-CM

## 2025-05-22 PROCEDURE — 99024 POSTOP FOLLOW-UP VISIT: CPT | Performed by: SURGERY

## 2025-05-22 NOTE — PROGRESS NOTES
[facility] INVASIVE BARIATRIC SURG  [address]  [address] [place], [place] [id number]  Dept: [phone]    SURGICAL WEIGHT LOSS MANAGEMENT PROGRAM  PROGRESS NOTE     CC: Weight Loss    Patient: Kristen Zavala      Service Date: 5/22/2025  Visit:   2.5 years    Medical Record #: [id number]  Date of Surgery:   12/13/2022    History: 55 y.o. female who presents today for a post op follow up for gastric bypass. Patient reports regular bowel movements. She denies nausea, vomiting, fever, and chills. She states the pain is well controlled.      Height:    Highest Weight:   253 lbs      Current Visit Weight Information  Weight:     BMI: There is no height or weight on file to calculate BMI.    Weight loss since surgery:  *** (253)    1 wk - D/C'd home on VTE Prohylaxis?   [x] Yes   [] No   On VTE Proph as directed?   [x] Yes   [] No    Liver pathology:    [] NA    [] No Gross path    [] Liver Steatosis   [x] Discussed w/ pt   [] Referred to GI    Exercising?   [x] Yes    [] No     Review of Systems:     General  Negative for: [] Weight Change   [x] Fatigue   [x] Fevers & Chills    [] Appetite change [] Other:    Positive for: [x] Weight Change   [] Fatigue   [] Fevers & Chills    [] Appetite change [] Other:   Cardiac  Negative for: [x] Chest Pain   [] Difficulty Breathing   [] Leg Cramps [x] Edema of Lower Extremeties    [] Left   [] Right      Positive for: [] Chest Pain   [] Difficulty Breathing   [] Leg Cramps [] Edema of Lower Extremeties    [] Left   [] Right   Pulmonary  Negative for: [x] Shortness of Breath [] Wheeze [x] Cough  [x] Calf Pain     Positive for: [] Shortness of Breath [] Wheeze [] Cough  [] Calf Pain   Gastro-Intestinal Negative for: [] Heartburn   [] Reflux   [] Dysphagia   [] Melena   [] BRBPR  [x] Vomiting   [x] Abdominal Pain   [] Diarrhea     [] Constipation  [] Other:     Positive for: [] Heartburn   [] Reflux   [] Dysphagia   [] Melena   []

## 2025-05-28 ENCOUNTER — TELEMEDICINE (OUTPATIENT)
Dept: BARIATRICS/WEIGHT MGMT | Age: 55
End: 2025-05-28
Payer: COMMERCIAL

## 2025-05-28 DIAGNOSIS — K90.89 OTHER SPECIFIED INTESTINAL MALABSORPTION: ICD-10-CM

## 2025-05-28 DIAGNOSIS — Z98.84 S/P GASTRIC BYPASS: Primary | ICD-10-CM

## 2025-05-28 PROCEDURE — 99213 OFFICE O/P EST LOW 20 MIN: CPT | Performed by: SURGERY

## 2025-05-28 NOTE — PROGRESS NOTES
[facility] INVASIVE BARIATRIC SURG  [address]  [address]  [place] [id number]  Dept: [phone]    SURGICAL WEIGHT LOSS MANAGEMENT PROGRAM  PROGRESS NOTE     CC: Weight Loss    Patient: Kristen Zavala      Service Date: 5/28/2025  Visit:   2.5 years    Medical Record #: [id number]  Date of Surgery:   12/13/2022    History: 55 y.o. female who presents today for a post op follow up for gastric bypass. Today's visit was conducted via [employer]. Patient reports regular bowel movements. She denies nausea, vomiting, fever, and chills. She has no concerns today apart from her eating behaviors. Patient states that her most recent weight was 178 lbs. Patient states she has been biking 8-10 miles daily which has improved her mood and confidence. Weight stability has contributed to patient's reports of depressive episodes. She is currently taking lithium and abilify as prescribed by Dr. Pacheco, Psychiatry She has not discussed these depressive episodes with Dr. Pacheco. She has been consuming more fluids which has helped her to limit portion sizes. She eats her first meal of the day around 4 pm and then binges into the evening. She states that she wants to continue losing weight as she has not met her goal.       Height:   5'5''  Highest Weight:   253 lbs        Current Visit Weight Information  Weight:     BMI: There is no height or weight on file to calculate BMI.    Weight loss since surgery:  75 lbs     1 wk - D/C'd home on VTE Prohylaxis?   [x] Yes   [] No   On VTE Proph as directed?   [x] Yes   [] No    Liver pathology:    [] NA    [] No Gross path    [] Liver Steatosis   [x] Discussed w/ pt   [] Referred to GI    Exercising?   [x] Yes    [] No     Review of Systems:     General  Negative for: [] Weight Change   [x] Fatigue   [x] Fevers & Chills    [] Appetite change [] Other:    Positive for: [x] Weight Change   [] Fatigue   [] Fevers & Chills    []

## 2025-07-08 ENCOUNTER — TELEMEDICINE (OUTPATIENT)
Dept: BARIATRICS/WEIGHT MGMT | Age: 55
End: 2025-07-08

## 2025-07-08 DIAGNOSIS — Z98.84 S/P GASTRIC BYPASS: Primary | ICD-10-CM

## 2025-07-08 NOTE — PROGRESS NOTES
visualized signs of difficulty breathing or respiratory distress        [] Abnormal-      Musculoskeletal:   [x] Normal gait with no signs of ataxia         [x] Normal range of motion of neck        [] Abnormal-       Neurological:        [x] No Facial Asymmetry (Cranial nerve 7 motor function) (limited exam to video visit)          [x] No gaze palsy        [] Abnormal-         Skin:        [x] No significant exanthematous lesions or discoloration noted on facial skin         [] Abnormal-            Psychiatric:       [x] Normal Affect [] No Hallucinations        [] Abnormal-     Other pertinent observable physical exam findings-     Abdomen: Soft, NT/ND      Assessment & Plan:      1. S/P gastric bypass         [x] Advance Diet    [x] Daily protein (65-75gm/day)   [x] Take Vitamins   [x] Attend Support Group    [x] Exercise Regularly   [x] Use contraception    S/P Gastric Bypass  Diet and activity progression discussed  Need for long term compliance and follow up stressed to patient  Continue taking daily Vitamins  Discussed that the patient would not be a candidate for weight loss medications at this time  Doing well    Pancho BHAKTA DO DO, personally performed the services described in this documentation. All medical record entries made by the scribe were at my direction and in my presence. I have reviewed the chart and discharge instructions (if applicable) and agree that the record reflects my personal performance and is accurate and complete.    Electronically Signed: Pancho Mckeon DO. 07/12/25. 1:51 PM.       Follow up: 1 month    Orders placed this encounter:   No orders of the defined types were placed in this encounter.      New Prescriptions:   No orders of the defined types were placed in this encounter.         IAntonio, am scribing for and in the presence of Pancho Mckeon DOj I, Gregory R Johnston, DO DO, personally performed the services described in this

## 2025-08-20 ENCOUNTER — TELEPHONE (OUTPATIENT)
Dept: BARIATRICS/WEIGHT MGMT | Age: 55
End: 2025-08-20

## (undated) DEVICE — SEAL

## (undated) DEVICE — COVER LT HNDL BLU PLAS

## (undated) DEVICE — TOWEL,OR,DSP,ST,NATURAL,DLX,4/PK,20PK/CS: Brand: MEDLINE

## (undated) DEVICE — 3M™ IOBAN™ 2 ANTIMICROBIAL INCISE DRAPE 6650EZ: Brand: IOBAN™ 2

## (undated) DEVICE — APPLICATOR MEDICATED 26 CC SOLUTION HI LT ORNG CHLORAPREP

## (undated) DEVICE — SUTURE MCRYL SZ 4-0 L18IN ABSRB UD L16MM PC-3 3/8 CIR PRIM Y845G

## (undated) DEVICE — STAPLER 60: Brand: SUREFORM

## (undated) DEVICE — LIQUIBAND RAPID ADHESIVE 36/CS 0.8ML: Brand: MEDLINE

## (undated) DEVICE — Device

## (undated) DEVICE — SUTURE ABSORBABLE MONOFILAMENT 2-0 CT2 12 IN UD MONOCRYL + SXMP1B416

## (undated) DEVICE — TROCAR: Brand: KII FIOS FIRST ENTRY

## (undated) DEVICE — SUTURE ETHBND EXCEL SZ 3-0 L30IN NONABSORBABLE GRN L26MM SH X832H

## (undated) DEVICE — TIP COVER ACCESSORY

## (undated) DEVICE — PROTECTOR ULN NRV PUR FOAM HK LOOP STRP ANATOMICALLY

## (undated) DEVICE — BLADELESS OBTURATOR: Brand: WECK VISTA

## (undated) DEVICE — SUTURE ABSRB BRAID COAT VLT SH 3-0 27IN VCRL J311H

## (undated) DEVICE — SUTURE PDS II SZ 0 L27IN ABSRB VLT L36MM CT-1 1/2 CIR Z340H

## (undated) DEVICE — SUTURE NONABSORBABLE MONOFILAMENT 3-0 PS-1 18 IN BLK ETHILON 1663H

## (undated) DEVICE — DEVICE TRCR 12X9X3IN WHT CLSR DISP OMNICLOSE

## (undated) DEVICE — 35 ML SYRINGE LUER-LOCK TIP: Brand: MONOJECT

## (undated) DEVICE — SYRINGE, LUER LOCK, 30ML: Brand: MEDLINE

## (undated) DEVICE — ARM DRAPE

## (undated) DEVICE — SUTURE VCRL + SZ 0 L27IN ABSRB VLT L26MM UR-6 5/8 CIR VCP603H

## (undated) DEVICE — GLOVE ORANGE PI 7 1/2   MSG9075

## (undated) DEVICE — FORCEPS BX 240CM 2.4MM L NDL RAD JAW 4 M00513334

## (undated) DEVICE — INSUFFLATION TUBING SET WITH FILTER, FUNNEL CONNECTOR AND LUER LOCK: Brand: JOSNOE MEDICAL INC

## (undated) DEVICE — SUTURE DEV SZ 2-0 WND CLSR ABSRB GS-22 VLOC COVIDIEN VLOCM2145

## (undated) DEVICE — 4-PORT MANIFOLD: Brand: NEPTUNE 2

## (undated) DEVICE — HYPODERMIC SAFETY NEEDLE: Brand: MAGELLAN

## (undated) DEVICE — DRAIN SURG PENROSE 0.25X18 IN CLOSED WND DRAINAGE PREM SIL

## (undated) DEVICE — SUTURE MCRYL + SZ 4-0 L18IN ABSRB UD L19MM PS-2 3/8 CIR MCP496G

## (undated) DEVICE — CANNULA SEAL

## (undated) DEVICE — 1LYRTR 16FR10ML100%SIL UMS SNP: Brand: MEDLINE INDUSTRIES, INC.

## (undated) DEVICE — COUNTER NDL 40 COUNT HLD 70 FOAM BLK ADH W/ MAG

## (undated) DEVICE — STRAP,POSITIONING,KNEE/BODY,FOAM,4X60": Brand: MEDLINE

## (undated) DEVICE — APPLIER CLP M L L11.4IN DIA10MM ENDOSCP ROT MULT FOR LIG

## (undated) DEVICE — STAPLER INT L L25MM DIA5MM GI WHT TI BARIATRIC CIR CUT 2

## (undated) DEVICE — SUTURE VCRL SZ 2-0 L27IN ABSRB UD L26MM SH 1/2 CIR J417H

## (undated) DEVICE — SOLUTION ANTIFOG VIS SYS CLEARIFY LAPSCP

## (undated) DEVICE — LEGGINGS, PAIR, 31X48, STERILE: Brand: MEDLINE

## (undated) DEVICE — SUTURE VCRL SZ 0 L54IN ABSRB UD POLYGLACTIN 910 COAT BRAID J608H

## (undated) DEVICE — ADHESIVE SKIN CLOSURE TOP 36 CC HI VISC DERMBND MINI

## (undated) DEVICE — SUTURE STRATAFIX SPRL PDS + SZ 0 L9IN ABSRB VLT CT-1 L36MM SXPP1B455

## (undated) DEVICE — Device: Brand: DEFENDO VALVE AND CONNECTOR KIT

## (undated) DEVICE — TUBING, SUCTION, 9/32" X 20', STRAIGHT: Brand: MEDLINE INDUSTRIES, INC.

## (undated) DEVICE — GARMENT,MEDLINE,DVT,INT,CALF,MED, GEN2: Brand: MEDLINE

## (undated) DEVICE — GAUZE,SPONGE,FLUFF,6"X6.75",STRL,5/TRAY: Brand: MEDLINE

## (undated) DEVICE — GLOVE ORANGE PI 8   MSG9080

## (undated) DEVICE — VESSEL SEALER EXTEND: Brand: ENDOWRIST

## (undated) DEVICE — BINDER ABD 3XL H9XL71 82IN E UNISX 3 PNL

## (undated) DEVICE — BITEBLOCK 54FR W/ DENT RIM BLOX

## (undated) DEVICE — REDUCER: Brand: ENDOWRIST

## (undated) DEVICE — DRESSING BORDERED ADH GZ UNIV GEN USE 5IN 4IN AND 2 1/2IN

## (undated) DEVICE — CONNECTOR TBNG AUX H2O JET DISP FOR OLY 160/180 SER

## (undated) DEVICE — PERRYSBURG ENDO PACK: Brand: MEDLINE INDUSTRIES, INC.

## (undated) DEVICE — GLOVE SURG SZ 65 THK91MIL LTX FREE SYN POLYISOPRENE

## (undated) DEVICE — PLUMEPORT SEO LAPAROSCOPIC SMOKE FILTRATION DEVICE: Brand: PLUMEPORT

## (undated) DEVICE — GOWN,AURORA,NONREINFORCED,LARGE: Brand: MEDLINE

## (undated) DEVICE — GLOVE ORANGE PI 7   MSG9070

## (undated) DEVICE — BINDER ABD UNISX 9IN 62IN L AND XL UNIV

## (undated) DEVICE — VISIGI 3D®  CALIBRATION SYSTEM  SIZE 36FR STD W/ BULB: Brand: BOEHRINGER® VISIGI 3D™ SLEEVE GASTRECTOMY CALIBRATION SYSTEM, SIZE 36FR W/BULB

## (undated) DEVICE — LAPAROSCOPIC SCISSORS: Brand: EPIX LAPAROSCOPIC SCISSORS

## (undated) DEVICE — DRESSING TRNSPAR W5XL4.5IN FLM SHT SEMIPERMEABLE WIND

## (undated) DEVICE — BINDER ABD 4 PANEL 82-100 IN 3XL 12 IN COTTON PROCARE

## (undated) DEVICE — ELECTRO LUBE IS A SINGLE PATIENT USE DEVICE THAT IS INTENDED TO BE USED ON ELECTROSURGICAL ELECTRODES TO REDUCE STICKING.: Brand: KEY SURGICAL ELECTRO LUBE

## (undated) DEVICE — FORCEPS BX L240CM JAW DIA2.8MM L CAP W/ NDL MIC MESH TOOTH

## (undated) DEVICE — SUTURE SZ 0 27IN 5/8 CIR UR-6  TAPER PT VIOLET ABSRB VICRYL J603H

## (undated) DEVICE — GOWN,SIRUS,NONRNF,SETINSLV,XL,20/CS: Brand: MEDLINE

## (undated) DEVICE — STAPLER INT W25MM WHT TRNSOR CIR ANVIL W/ ADVANCING PROX